# Patient Record
Sex: FEMALE | Race: BLACK OR AFRICAN AMERICAN | Employment: UNEMPLOYED | URBAN - METROPOLITAN AREA
[De-identification: names, ages, dates, MRNs, and addresses within clinical notes are randomized per-mention and may not be internally consistent; named-entity substitution may affect disease eponyms.]

---

## 2021-03-03 ENCOUNTER — HOSPITAL ENCOUNTER (OUTPATIENT)
Age: 26
Discharge: HOME | End: 2021-03-03
Payer: MEDICARE

## 2021-03-03 PROCEDURE — 86481 TB AG RESPONSE T-CELL SUSP: CPT

## 2021-03-06 LAB — T-SPOT TB TEST: NORMAL

## 2021-04-04 ENCOUNTER — APPOINTMENT (OUTPATIENT)
Dept: GENERAL RADIOLOGY | Age: 26
End: 2021-04-04
Payer: MEDICARE

## 2021-04-04 ENCOUNTER — HOSPITAL ENCOUNTER (EMERGENCY)
Age: 26
Discharge: HOME | End: 2021-04-04
Attending: EMERGENCY MEDICINE
Payer: MEDICARE

## 2021-04-04 VITALS
SYSTOLIC BLOOD PRESSURE: 126 MMHG | HEART RATE: 76 BPM | BODY MASS INDEX: 25.43 KG/M2 | HEIGHT: 67 IN | DIASTOLIC BLOOD PRESSURE: 73 MMHG | OXYGEN SATURATION: 100 % | RESPIRATION RATE: 16 BRPM | TEMPERATURE: 98.7 F | WEIGHT: 162 LBS

## 2021-04-04 DIAGNOSIS — M23.91 ACUTE INTERNAL DERANGEMENT OF RIGHT KNEE: Primary | ICD-10-CM

## 2021-04-04 PROCEDURE — 73562 X-RAY EXAM OF KNEE 3: CPT

## 2021-04-04 PROCEDURE — 99283 EMERGENCY DEPT VISIT LOW MDM: CPT

## 2021-04-04 RX ORDER — IBUPROFEN 800 MG/1
800 TABLET ORAL EVERY 8 HOURS PRN
Qty: 20 TABLET | Refills: 0 | Status: SHIPPED | OUTPATIENT
Start: 2021-04-04 | End: 2021-08-25

## 2021-04-04 RX ORDER — ACETAMINOPHEN AND CODEINE PHOSPHATE 300; 30 MG/1; MG/1
1 TABLET ORAL EVERY 6 HOURS PRN
Qty: 20 TABLET | Refills: 0 | Status: SHIPPED | OUTPATIENT
Start: 2021-04-04 | End: 2021-04-09

## 2021-04-04 ASSESSMENT — ENCOUNTER SYMPTOMS
EYE REDNESS: 0
ABDOMINAL PAIN: 0
CONSTIPATION: 0
FACIAL SWELLING: 0
DIARRHEA: 0
EYE DISCHARGE: 0
COUGH: 0
COLOR CHANGE: 0
VOMITING: 0
SHORTNESS OF BREATH: 0

## 2021-04-04 ASSESSMENT — PAIN DESCRIPTION - DESCRIPTORS: DESCRIPTORS: SHARP;CONSTANT

## 2021-04-04 NOTE — LETTER
[facility] ED  [address] [address]  Phone: [phone]             April 6, 2021    Patient: Jesica Bustillos   YOB: 1995   Date of Visit: 4/4/2021       To Whom It May Concern:    Jacob Adames was seen and treated in our emergency department on 4/4/2021. She may return to work on 4/8/21.       Sincerely,             Signature:__________________________________

## 2021-04-04 NOTE — ED PROVIDER NOTES
[address] [place] [address] ED  EMERGENCY DEPARTMENT ENCOUNTER      Pt Name: Vic Salvador  MRN: [id number]  [place] 1995  Date of evaluation: 4/4/2021  Provider: Karla Becker MD    CHIEF COMPLAINT       Chief Complaint   Patient presents with    Knee Injury     right knee, tripped going down steps this am         HISTORY OF PRESENT ILLNESS  (Location/Symptom, Timing/Onset, Context/Setting, Quality, Duration, Modifying Factors, Severity.)   Vic Salvador is a 31 y.o. female who presents to the emergency department for pain to the right knee. She was coming down steps and missed 1 and when she landed she twisted her right knee. She did not fall. Initially it was not swollen but it is now. 10 years ago she had ACL repair in [place]. She rated the pain as an 8 and its continuous and worse if she bends her knee. Nursing Notes were reviewed. ALLERGIES     Patient has no known allergies. CURRENT MEDICATIONS       Previous Medications    No medications on file       PAST MEDICAL HISTORY   History reviewed. No pertinent past medical history. SURGICAL HISTORY           Procedure Laterality Date    KNEE SURGERY           FAMILY HISTORY     History reviewed. No pertinent family history. No family status information on file. SOCIAL HISTORY      reports that she has never smoked. She has never used smokeless tobacco. She reports current alcohol use. She reports that she does not use drugs. REVIEW OF SYSTEMS    (2-9 systems for level 4, 10 or more for level 5)     Review of Systems   Constitutional: Negative for chills, fatigue and fever. HENT: Negative for congestion, ear discharge and facial swelling. Eyes: Negative for discharge and redness. Respiratory: Negative for cough and shortness of breath. Cardiovascular: Negative for chest pain. Gastrointestinal: Negative for abdominal pain, constipation, diarrhea and vomiting. Genitourinary: Negative for dysuria and hematuria. Musculoskeletal: Negative for arthralgias. Skin: Negative for color change and rash. Neurological: Negative for syncope, numbness and headaches. Hematological: Negative for adenopathy. Psychiatric/Behavioral: Negative for confusion. The patient is not nervous/anxious. Except as noted above the remainder of the review of systems was reviewed and negative. PHYSICAL EXAM    (up to 7 for level 4, 8 or more for level 5)     Vitals:    04/04/21 1240   BP: 126/73   Pulse: 76   Resp: 16   Temp: 98.7 °F (37.1 °C)   TempSrc: Oral   SpO2: 100%   Weight: 162 lb (73.5 kg)   Height: 5' 7\" (1.702 m)       Physical Exam  Vitals signs reviewed. Constitutional:       General: She is not in acute distress. Appearance: She is well-developed. She is not diaphoretic. HENT:      Head: Normocephalic and atraumatic. Eyes:      General: No scleral icterus. Right eye: No discharge. Left eye: No discharge. Neck:      Musculoskeletal: Neck supple. Cardiovascular:      Rate and Rhythm: Normal rate and regular rhythm. Pulmonary:      Effort: Pulmonary effort is normal. No respiratory distress. Breath sounds: Normal breath sounds. No stridor. No wheezing or rales. Abdominal:      General: There is no distension. Palpations: Abdomen is soft. Tenderness: There is no abdominal tenderness. Musculoskeletal:      Comments: Right knee is mildly swollen. There is no ballotable effusion. The knee joint is stable. No laxity on varus or valgus motion in the anterior and posterior drawer test are normal.  Ankle and hip nontender. Lymphadenopathy:      Cervical: No cervical adenopathy. Skin:     General: Skin is warm and dry. Findings: No erythema or rash. Neurological:      Mental Status: She is alert and oriented to person, place, and time.    Psychiatric:         Behavior: Behavior normal.             DIAGNOSTIC RESULTS     EKG: All EKG's are interpreted by the Emergency

## 2021-04-05 ENCOUNTER — TELEPHONE (OUTPATIENT)
Dept: ORTHOPEDIC SURGERY | Age: 26
End: 2021-04-05

## 2021-04-05 NOTE — TELEPHONE ENCOUNTER
----- Message from Patience Ramirez DO sent at 4/4/2021  5:49 PM EDT -----  Can see Ted Kuhn Tuesday or Wednesday  ----- Message -----  From: Gibran Sutherland MD  Sent: 4/4/2021   2:02 PM EDT  To: Patience Ramirez DO

## 2021-04-05 NOTE — TELEPHONE ENCOUNTER
Per Dr. Nadeem Chaves to have patient follow up with [facility]. Called and left message to get her in on Wednesday with [facility].

## 2021-04-07 DIAGNOSIS — M25.561 RIGHT KNEE PAIN, UNSPECIFIED CHRONICITY: Primary | ICD-10-CM

## 2021-04-08 ENCOUNTER — OFFICE VISIT (OUTPATIENT)
Dept: ORTHOPEDIC SURGERY | Age: 26
End: 2021-04-08
Payer: MEDICARE

## 2021-04-08 VITALS
SYSTOLIC BLOOD PRESSURE: 126 MMHG | DIASTOLIC BLOOD PRESSURE: 61 MMHG | WEIGHT: 162 LBS | HEIGHT: 67 IN | HEART RATE: 66 BPM | BODY MASS INDEX: 25.43 KG/M2

## 2021-04-08 DIAGNOSIS — S83.281A TEAR OF LATERAL MENISCUS OF RIGHT KNEE, CURRENT, UNSPECIFIED TEAR TYPE, INITIAL ENCOUNTER: Primary | ICD-10-CM

## 2021-04-08 PROCEDURE — 99203 OFFICE O/P NEW LOW 30 MIN: CPT | Performed by: ORTHOPAEDIC SURGERY

## 2021-04-08 PROCEDURE — G8419 CALC BMI OUT NRM PARAM NOF/U: HCPCS | Performed by: ORTHOPAEDIC SURGERY

## 2021-04-08 PROCEDURE — 1036F TOBACCO NON-USER: CPT | Performed by: ORTHOPAEDIC SURGERY

## 2021-04-08 PROCEDURE — G8427 DOCREV CUR MEDS BY ELIG CLIN: HCPCS | Performed by: ORTHOPAEDIC SURGERY

## 2021-04-08 ASSESSMENT — ENCOUNTER SYMPTOMS
CONSTIPATION: 0
DIARRHEA: 0
ABDOMINAL PAIN: 0
COLOR CHANGE: 0
VOMITING: 0
NAUSEA: 0
ABDOMINAL DISTENTION: 0
COUGH: 0
SHORTNESS OF BREATH: 0
CHEST TIGHTNESS: 0
APNEA: 0

## 2021-04-08 NOTE — PROGRESS NOTES
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 The pain has been present for 4 days. The patient recalls a specific injury where the knee twisted after she missed one step when she was walking down the stairs at her new apartment on 04/04/2021. The patient has tried tylenol #3, ibuprofen, rest and crutches with minimal improvement. The pain is now described as Achy and Sharp. There is pain on weight bearing. The knee has swelled. There is  painful popping and clicking. The knee has not caught or locked up. The knee has not given out. It is not stiff upon arising from sitting but it is painful when arising from sitting. It is painful to go up and down stairs and sit for a prolonged time. The patient has not had a cortisone injection. The patient has not tried a lubrication injection. The patient has not tried physical therapy. The patient has had surgery 10 years ago in [place] and she had her ACL reconstructed arthroscopically. Patient states that it is hard for her to work and she also mentioned that the knee is painful when shifting laterally. Patient was referred to us after she went to the ED at [facility] [address] on 04/04/2021. ROS:   Review of Systems   Constitutional: Positive for activity change. Negative for appetite change, fatigue and fever. Respiratory: Negative for apnea, cough, chest tightness and shortness of breath. Cardiovascular: Negative for chest pain, palpitations and leg swelling. Gastrointestinal: Negative for abdominal distention, abdominal pain, constipation, diarrhea, nausea and vomiting.    Genitourinary: Negative for difficulty urinating, dysuria and hematuria. Musculoskeletal: Positive for arthralgias. Negative for gait problem, joint swelling and myalgias. Skin: Negative for color change and rash. Neurological: Negative for dizziness, weakness, numbness and headaches. Psychiatric/Behavioral: Negative for sleep disturbance. Past Medical History:    No past medical history on file. Past Surgical History:    Past Surgical History:   Procedure Laterality Date    KNEE SURGERY       Current Medications:   Current Outpatient Medications   Medication Sig Dispense Refill    ibuprofen (ADVIL;MOTRIN) 800 MG tablet Take 1 tablet by mouth every 8 hours as needed for Pain 20 tablet 0     No current facility-administered medications for this visit. Allergies:    Patient has no known allergies.     Social History:   Social History     Socioeconomic History    Marital status: Single     Spouse name: None    Number of children: None    Years of education: None    Highest education level: None   Occupational History    None   Social Needs    Financial resource strain: None    Food insecurity     Worry: None     Inability: None    Transportation needs     Medical: None     Non-medical: None   Tobacco Use    Smoking status: Never Smoker    Smokeless tobacco: Never Used   Substance and Sexual Activity    Alcohol use: Yes     Comment: socially    Drug use: No    Sexual activity: None   Lifestyle    Physical activity     Days per week: None     Minutes per session: None    Stress: None   Relationships    Social connections     Talks on phone: None     Gets together: None     Attends Anabaptist service: None     Active member of club or organization: None     Attends meetings of clubs or organizations: None     Relationship status: None    Intimate partner violence     Fear of current or ex partner: None     Emotionally abused: None     Physically abused: None     Forced sexual activity: None   Other Topics Concern    None   Social History Narrative    None     Family History:  No family history on file. Vitals:   /61   Pulse 66   Ht 5' 7\" (1.702 m)   Wt 162 lb (73.5 kg)   LMP 03/21/2021   BMI 25.37 kg/m²  Body mass index is 25.37 kg/m². Physical Examination:     Orthopedics:    GENERAL: Alert and oriented X3 in no acute distress. SKIN: Intact without lesions or ulcerations. NEURO: Intact to sensory and motor testing. VASC: Capillary refill is less than 3 seconds. KNEE EXAM    LOCATION: Right Knee  GEN: Alert and oriented X 3, in no acute distress. GAIT: The patient's gait was observed while entering the exam room and was noted to be antalgic. The extremity is in anatomic alignment. SKIN: Intact without rashes, lesions, or ulcerations. No obvious deformity or swelling. NEURO: The patient responds to light touch throughout right LE. Patellar and Achilles reflexes are 2/4. VASC: The right LE is neurovascularly intact with 2/4 DP and 2/4 PT pulses. Brisk capillary refill. ROM: 0/116 degrees vs. 0/135 degrees contralaterally. There is moderate effusion. Lateral pain with forced extension. Popping noted with ROM testing. MUSC: decreased quad tone  LIGAMENT: Lachman's test is 1+ with Fair endpoint. Anterior drawer Negative. Posterior drawer Negative. There is 1+ varus instability at 0 degrees and 1+ varus instability at 30 degrees. There is No valgus instability at 0 degrees and 1+ valgus instability at 30 degrees. SPECIAL: Mary Jo test is positive with no clunks and no crepitation but there is pain with lateral popping. PALP: There is no joint line pain. Assessment:     1. Tear of lateral meniscus of right knee, current, unspecified tear type, initial encounter      Procedures:    Procedure: no  Radiology:   X-RAY Right Knee    One view patella reveals mild traumatic arthritic changes. Also 4/4/2021 ER NWB compared to 10/16/2016 x-rays.    Plan:   Treatment : I reviewed the X-ray with the patient and I informed her that the knee has no evident fractures that I can see on the radiographs. We discussed the etiologies and natural histories of acute tear of the lateral meniscus in the right knee and s/p right knee arthroscopy with ACL reconstruction. We discussed the various treatment alternatives including anti-inflammatory medications, physical therapy, injections, further imaging studies and as a last result surgery. During today's visit, I explained to the patient that I understand she injured her knee but I feel it would be best for us to have her attend physical therapy over the next few weeks so they can help her regain more ROM over the next few weeks because I do not feel she is doing any damage. I also explained to her that we will need to get her into PT because we need to get her back to work since she just started her new job. Altogether, I explained to her that I believe she may have an acute lateral meniscus tear because she had pain with Jean Pierre testing of the knee during my physical examination. I also told her that she is okay to weight bear on her knee as well. From there, I instructed the patient to take the ibuprofen every day so she can reduce her inflammation that she has in her knee today. In addition, I explained to her that we will order an MRI of the knee to check the soft tissue and cartilage for any tears and then based on what is going on in the knee, we will determine if surgical intervention is needed. The patient then stated that she understands the plan and at this time, the patient has opted for a physical therapy prescription and an order for an MRI of the right knee. A physical therapy prescription was given. Patient should return to the clinic after her MRI and she is to follow up with Jillian MCCORMICK to review the MRI of the right knee. The patient will call the office immediately with any problems.       No orders of the defined types were placed in this encounter. Orders Placed This Encounter   Procedures    MRI KNEE RIGHT WO CONTRAST     Standing Status:   Future     Standing Expiration Date:   4/8/2022     Order Specific Question:   Reason for exam:     Answer:   lateral meniscus tear    [facility]     Referral Priority:   Routine     Referral Type:   Eval and Treat     Referral Reason:   Specialty Services Required     Requested Specialty:   Physical Therapy     Number of Visits Requested:   1     Carlitos ROBBINS am scribing for and in the presence of Mliss January D. O.. 4/14/2021  3:32 PM     I spent 14 minutes of face to face time with this patient. Khushbu Mobley DO, have personally seen this patient and I have reviewed the CC, PMH, FHX and Social History as provided by other clinical staff. I reassessed the HPI and ROS as scribed by Zuleika Sin Scribjing in my presence and it is both accurate and complete. Thereafter, I personally performed the PE, reviewed the imaging and established the DX and POC. I agree with the documentation provided by the Medical Scribe. I have reviewed all documentation in its entirety prior to providing my signature indicating agreement. Any areas of disagreement are noted on the chart.     Electronically signed by Rony Mckenzie DO on 4/14/2021 at 3:33 PM        Electronically signed by Rony Mckenzie DO, on 4/14/2021 at 3:32 PM

## 2021-04-08 NOTE — LETTER
[address] [place] [address] and Sports Medicine  [address] [address]  Phone: [phone]  Fax: DO Markos        April 8, 2021     Patient: Bonnie Rodriguez   YOB: 1995   Date of Visit: 4/8/2021       To Whom It May Concern: It is my medical opinion that Opal Hidalgo remain off work until we get the results from her MRI of her right knee. If you have any questions or concerns, please don't hesitate to call.     Sincerely,        Shae Paige DO

## 2021-04-14 ENCOUNTER — HOSPITAL ENCOUNTER (OUTPATIENT)
Dept: PHYSICAL THERAPY | Facility: CLINIC | Age: 26
Setting detail: THERAPIES SERIES
Discharge: HOME | End: 2021-04-14
Payer: MEDICARE

## 2021-04-14 PROCEDURE — 97110 THERAPEUTIC EXERCISES: CPT

## 2021-04-14 PROCEDURE — 97161 PT EVAL LOW COMPLEX 20 MIN: CPT

## 2021-04-14 PROCEDURE — 97016 VASOPNEUMATIC DEVICE THERAPY: CPT

## 2021-04-14 NOTE — CONSULTS
[] [address] [place] [address]  Outpatient Rehabilitation &  Therapy  [address] [place] [address]  P: [phone]  F: [phone] [] [address]  P: [phone]  F: [phone] [x] [address]  [address] [address] [facility]   [address]   [place]: [phone]  F: [phone]         Physical Therapy Evaluation    Date:  2021  Patient: Annalisa Harrison   : 1995  MRN: [id number]  Physician: Dr. 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 The pain has been present for 4 days. The patient recalls a specific injury where the knee twisted after she missed one step when she was walking down the stairs at her new apartment on 2021. The patient has tried tylenol #3, ibuprofen, rest and crutches with minimal improvement. The pain is now described as Achy and Sharp. There is pain on weight bearing. The knee has swelled. There is  painful popping and clicking. The knee has not caught or locked up. The knee has not given out. It is not stiff upon arising from sitting but it is painful when arising from sitting. It is painful to go up and down stairs and sit for a prolonged time. The patient has not had a cortisone injection. The patient has not tried a lubrication injection. The patient has not tried physical therapy. The patient has had surgery 10 years ago in [place] and she had her ACL reconstructed arthroscopically. Patient states that it is hard for her to work and she also mentioned that the knee is painful when shifting laterally. Patient was referred to us after she went to the ED at [address] [place] [address],[address] on 2021. After it happened I went to work.  Was sitting for 2 hrs then on the floor for 3 hrs and by midnight couldn't walk anymore so got the knee checked out. It is getting better with regard to pain but the swelling is still there and still having some difficulty walking      PMHx: [] Unremarkable [] Diabetes [] HTN  [] Pacemaker   [] MI/Heart Problems [] Cancer [] Arthritis  [] Other:              [x] Refer to full medical chart  In EPIC     Tests: [] X-Ray: [x] MRI: Scheduled for 4/15/21 [] none:     Medications: [x] Refer to full medical record [] None [] Other:  Allergies:      [x] Refer to full medical record [] None [] Other:    Working:  [] Normal Duty  [] Light Duty  [x] Off D/T Condition  [] Retired    [] Not Employed    []  Disability  [] Other:           Return to work:unknown    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  Drawer  - []   Hip Flex WNL WNL   Post. Drawer   []   Ext WNL WNL 5- 5- Lachmans   []   ER WNL WNL 5 5 Valgus Stress  - []   IR     Varus Stress  - []   ABD WNL WNL 5 4+ Mary Jos  - []   Knee Flex 0-137 0-112   Apley compression  + []   Ext WNL WNL   Hip Scouring   []   Ankle DF stiff stiff   NORBERTs   []   PF WNL WNL   Piriformis   []   INV WNL WNL   Anibals   []   EVER WNL WNL   Michele     []   * pt reports that post ACL repair she was not able to get soft tissue approximation on the R knee with flexion    OBSERVATION No Deficit Deficit Not Tested Comments   Genu Valgus [] [x] [] B   Palpation [] [x] [] Mild joint line tenderness laterally   Sensation [] [] []    Edema [] [x] [] Knee jnt lineR36.3,L 33.5  Suprapatella R38.7,L 37.7   Patellar Mobility [x] [] []    Gait [] [x] [] Pt ambulates without device knee flexed throughout gait cycle          FUNCTIONAL TESTS PAIN NO PAIN COMMENTS   2 legged squat [x] [] Offloads L   1 legged squat [x] [] Valgus collapse       Functional Test: LEFI Score: 41% functionally impaired     Comments:  Assessment:Patient would benefit from skilled physical therapy services in order to: increase knee flexion ROM, increase functional strength and proprioception as well as decrease knee joint effusion to allow for normal gait  Problems:    [x] ? Pain:     [x] ? ROM:    [] ? Strength:    [x] ? Function:    [] ? Balance  [] Increased edema:  [] Postural Deviations  [x] Gait Deviations  [x]  LEFI score is 47/80  [] Other:      STG: (to be met in 10 treatments)  1. ? Pain:<3/10 R knee to facilitate normal gait  2. ? ROM: R Knee flexion 0-120 or better, R ankle DF 20 deg to facilitate normal gait  3. ? Function:Able to walk normally community distances without device  4. Independent with Home Exercise Programs    LTG: (to be met in 20 treatments)  1. No pain R knee to allow for normal daily function  2. Increase quad strength so that pt can demonstrate good eccentric quad control on 6inch step  3. LEFI 64/80 or better                 Patient goals: Reduce pain and walk normal    Rehab Potential:  [x] Good  [] Fair  [] Poor   Suggested Professional Referral:  [x] No  [] Yes:  Barriers to Goal Achievement[de-identified]  [x] No  [] Yes:  Domestic Concerns:  [x] No  [] Yes:    Pt. Education:  [x] Plans/Goals, Risks/Benefits discussed  [x] Home exercise program    Method of Education: [x] Verbal  [x] Demo  [x] Written- as per log  Comprehension of Education:  [x] Verbalizes understanding. [] Demonstrates understanding. [] Needs Review.   [] Demonstrates/verbalizes understanding of HEP/Ed previously given.    Treatment Plan:  [x] Therapeutic Exercise    [x] Therapeutic Activity  [x] Manual Therapy   [x] Alter G treadmill  [x] Phys perf test     [x] Vasocompression/Game Ready   [x] Neuromuscular Re-education [x] Instruction in HEP                               Frequency:  2x/week for 20 visits    Todays Treatment:  Modalities: Vasocompression 34 deg, medium  Pressure x 15 min  Precautions: standard  Exercises:  Exercise Reps/ Time Weight/ Level Issued for HEP  MOBO Y/N Comments   Prone         QS 10x10\"   x                       Supine         QS 10x10\"   x     SLR x10   x                       Gym         Bike warmup 5'   x     Step calf stretch 3x30\"   x     HS stretch 3x30\"   x                                                                                                                          Other:    Specific Instructions for next treatment: progress strengthening focused on knee and ankle ROM and LE strength to control genu valgus    Treatment Charges: Mins Units   [x] Evaluation       [x]  Low       []  Moderate       []  High 10 1   [] Phys perf test     [x]  Ther Exercise 25 2   []  Manual Therapy     []  Ther Activities     []  Aquatics     [x]  Vasocompression 15 1   []  NMR       TOTAL TREATMENT TIME: 50    Time in: 1109  Time Out:1405    Electronically signed by: Linn Self PT        Physician Signature:________________________________Date:__________________  By signing above or cosigning this note, I have reviewed this plan of care and certify a need for medically necessary rehabilitation services.      *PLEASE SIGN ABOVE AND FAX BACK ALL PAGES*

## 2021-04-15 ENCOUNTER — TELEPHONE (OUTPATIENT)
Dept: ORTHOPEDIC SURGERY | Age: 26
End: 2021-04-15

## 2021-04-15 ENCOUNTER — HOSPITAL ENCOUNTER (OUTPATIENT)
Dept: MRI IMAGING | Facility: CLINIC | Age: 26
Discharge: HOME | End: 2021-04-17
Payer: MEDICARE

## 2021-04-15 DIAGNOSIS — S83.281A TEAR OF LATERAL MENISCUS OF RIGHT KNEE, CURRENT, UNSPECIFIED TEAR TYPE, INITIAL ENCOUNTER: ICD-10-CM

## 2021-04-15 PROCEDURE — 73721 MRI JNT OF LWR EXTRE W/O DYE: CPT

## 2021-04-19 ENCOUNTER — OFFICE VISIT (OUTPATIENT)
Dept: ORTHOPEDIC SURGERY | Age: 26
End: 2021-04-19
Payer: MEDICARE

## 2021-04-19 ENCOUNTER — TELEPHONE (OUTPATIENT)
Dept: ORTHOPEDIC SURGERY | Age: 26
End: 2021-04-19

## 2021-04-19 VITALS
BODY MASS INDEX: 25.43 KG/M2 | WEIGHT: 162 LBS | HEIGHT: 67 IN | SYSTOLIC BLOOD PRESSURE: 117 MMHG | DIASTOLIC BLOOD PRESSURE: 78 MMHG | HEART RATE: 77 BPM

## 2021-04-19 DIAGNOSIS — Z98.890 HX OF ANTERIOR CRUCIATE LIGAMENT TEAR RECONSTRUCTION: ICD-10-CM

## 2021-04-19 DIAGNOSIS — M12.561 TRAUMATIC ARTHRITIS OF KNEE, RIGHT: ICD-10-CM

## 2021-04-19 DIAGNOSIS — S83.241D ACUTE MEDIAL MENISCUS TEAR OF RIGHT KNEE, SUBSEQUENT ENCOUNTER: Primary | ICD-10-CM

## 2021-04-19 PROCEDURE — G8427 DOCREV CUR MEDS BY ELIG CLIN: HCPCS | Performed by: ORTHOPAEDIC SURGERY

## 2021-04-19 PROCEDURE — 20610 DRAIN/INJ JOINT/BURSA W/O US: CPT | Performed by: ORTHOPAEDIC SURGERY

## 2021-04-19 PROCEDURE — G8419 CALC BMI OUT NRM PARAM NOF/U: HCPCS | Performed by: ORTHOPAEDIC SURGERY

## 2021-04-19 PROCEDURE — 99214 OFFICE O/P EST MOD 30 MIN: CPT | Performed by: ORTHOPAEDIC SURGERY

## 2021-04-19 PROCEDURE — 1036F TOBACCO NON-USER: CPT | Performed by: ORTHOPAEDIC SURGERY

## 2021-04-19 RX ORDER — LIDOCAINE HYDROCHLORIDE 10 MG/ML
8 INJECTION, SOLUTION INFILTRATION; PERINEURAL ONCE
Status: COMPLETED | OUTPATIENT
Start: 2021-04-19 | End: 2021-04-19

## 2021-04-19 RX ORDER — METHYLPREDNISOLONE ACETATE 40 MG/ML
40 INJECTION, SUSPENSION INTRA-ARTICULAR; INTRALESIONAL; INTRAMUSCULAR; SOFT TISSUE ONCE
Status: COMPLETED | OUTPATIENT
Start: 2021-04-19 | End: 2021-04-19

## 2021-04-19 RX ADMIN — METHYLPREDNISOLONE ACETATE 40 MG: 40 INJECTION, SUSPENSION INTRA-ARTICULAR; INTRALESIONAL; INTRAMUSCULAR; SOFT TISSUE at 12:25

## 2021-04-19 RX ADMIN — LIDOCAINE HYDROCHLORIDE 8 ML: 10 INJECTION, SOLUTION INFILTRATION; PERINEURAL at 12:23

## 2021-04-19 ASSESSMENT — ENCOUNTER SYMPTOMS
COUGH: 0
COLOR CHANGE: 0
CONSTIPATION: 0
APNEA: 0
NAUSEA: 0
ABDOMINAL DISTENTION: 0
SHORTNESS OF BREATH: 0
CHEST TIGHTNESS: 0
DIARRHEA: 0
ABDOMINAL PAIN: 0
VOMITING: 0

## 2021-04-19 NOTE — LETTER
[address] [place] [address] and Sports [address] [address]  Phone: [phone]  Fax: DO Markos        April 19, 2021     Patient: Jacob Sotomayor   YOB: 1995   Date of Visit: 4/19/2021       To Whom It May Concern: It is my medical opinion that Rhonda Alcaraz may return to work on 4/21/2021 with no restrictions. If you have any questions or concerns, please don't hesitate to call.     Sincerely,        Sydnie Barrett DO

## 2021-04-19 NOTE — PROGRESS NOTES
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 The pain has been present for 2 weeks. The patient recalls a specific injury where the knee was twisted on 04/04/2021 when she missed a step walking down her stairs at her new apartment. The patient has tried tylenol #3, ibuprofen, rest and crutches with no improvement. The pain is now described as Desiree Manisha and Sharp. There is pain on weight bearing. The knee has swelled. There is painful popping and clicking. The knee has not caught or locked up. The knee has not given out. It is not stiff upon arising from sitting. It is painful to go up and down stairs and sit for a prolonged time. The patient has not had a cortisone injection. The patient has not tried a lubrication injection. The patient has tried physical therapy and she states that therapy aggravate her knee. The patient has had surgery 10 years ago when she was in [place] and she states that she had an ACL reconstruction. Patient states that her knee pain is still the same and nothing has changed since her last office visit. She is here today to review her MRI. ROS:   Review of Systems   Constitutional: Positive for activity change. Negative for appetite change, fatigue and fever. Respiratory: Negative for apnea, cough, chest tightness and shortness of breath. Cardiovascular: Negative for chest pain, palpitations and leg swelling. Gastrointestinal: Negative for abdominal distention, abdominal pain, constipation, diarrhea, nausea and vomiting. Genitourinary: Negative for difficulty urinating, dysuria and hematuria. Musculoskeletal: Positive for arthralgias. Negative for gait problem, joint swelling and myalgias. Skin: Negative for color change and rash. Neurological: Negative for dizziness, weakness, numbness and headaches. Psychiatric/Behavioral: Negative for sleep disturbance. Past Medical History:    No past medical history on file. Past Surgical History:    Past Surgical History:   Procedure Laterality Date    KNEE SURGERY       Current Medications:   Current Outpatient Medications   Medication Sig Dispense Refill    ibuprofen (ADVIL;MOTRIN) 800 MG tablet Take 1 tablet by mouth every 8 hours as needed for Pain 20 tablet 0     No current facility-administered medications for this visit. Allergies:    Patient has no known allergies.     Social History:   Social History     Socioeconomic History    Marital status: Single     Spouse name: None    Number of children: None    Years of education: None    Highest education level: None   Occupational History    None   Social Needs    Financial resource strain: None    Food insecurity     Worry: None     Inability: None    Transportation needs     Medical: None     Non-medical: None   Tobacco Use    Smoking status: Never Smoker    Smokeless tobacco: Never Used   Substance and Sexual Activity    Alcohol use: Yes     Comment: socially    Drug use: No    Sexual activity: None   Lifestyle    Physical activity     Days per week: None     Minutes per session: None    Stress: None   Relationships    Social connections     Talks on phone: None     Gets together: None     Attends Religion service: None     Active member of club or organization: None     Attends meetings of clubs or organizations: None     Relationship status: None    Intimate partner violence     Fear of current or ex partner: None     Emotionally abused: None     Physically abused: None     Forced sexual activity: None   Other Topics Concern    None   Social History Narrative    None Family History:  No family history on file. Vitals:   /78   Pulse 77   Ht 5' 7\" (1.702 m)   Wt 162 lb (73.5 kg)   LMP 03/21/2021   BMI 25.37 kg/m²  Body mass index is 25.37 kg/m². Physical Examination:     Orthopedics:    GENERAL: Alert and oriented X3 in no acute distress. SKIN: Intact without lesions or ulcerations. NEURO: Intact to sensory and motor testing. VASC: Capillary refill is less than 3 seconds. KNEE EXAM    LOCATION: Right Knee  GEN: Alert and oriented X 3, in no acute distress. GAIT: The patient's gait was observed while entering the exam room and was noted to be antalgic. The extremity is in valgus alignment. SKIN: Intact without rashes, lesions, or ulcerations. No obvious deformity or swelling. NEURO: The patient responds to light touch throughout right LE. Patellar and Achilles reflexes are 2/4. VASC: The right LE is neurovascularly intact with 2/4 DP and 2/4 PT pulses. Brisk capillary refill. ROM: 5/114 degrees vs. 0/136 degrees contralaterally. There is mild effusion. Pain with forced extension. Valgus deformity is partially correctable. MUSC: decreased quad tone  LIGAMENT: Lachman's test is Negative with Good endpoint. Anterior drawer Negative. Posterior drawer Negative. There is 1+ varus instability at 0 degrees and 2+ varus instability at 30 degrees. There is No valgus instability at 0 degrees and No valgus instability at 30 degrees. SPECIAL: Mary Jo test is negative with no clunks, no crepitation, and no pain but there is popping. PALP: There is no joint line pain. Assessment:     1. Acute medial meniscus tear of right knee, subsequent encounter    2. Traumatic arthritis of knee, right    3.  Hx of anterior cruciate ligament tear reconstruction      Procedures:    Procedure: yes    Arthrocentesis    Location: Right Knee  Procedure: After consent was obtained, using sterile technique the Right knee was prepped and plain Lidocaine 1% was used as local anesthetic. The joint was entered and 17 cc's of yellow colored fluid was withdrawn. The procedure was well tolerated. The patient is asked to continue to rest the joint for a few more days before resuming regular activities. It may be more painful for the first 1-2 days. Watch for fever, or increased swelling or persistent pain in the joint. Call or return to clinic prn if such symptoms occur or there is failure to improve as anticipated. Regular Knee Injection    Location: Right Knee  Procedure: I discussed in detail the risks, benefits and complications of the corticosteroid injection which included but are not limited to: infection, skin reactions, hot swollen, and anaphylaxis with the patient. Sabrina Bronson verbalized understanding and they have agreed to have the corticosteroid injection into the right knee. The patient was placed in the Supine position on the exam table. The superior lateral portal was identified and marked with a ball point pen. The skin was prepped with betadine in a sterile fashion. Utilizing a clean technique with sterile gloves, a 5 cc solution containing 4 cc of 1.0% Lidocaine with 1 cc containing 40 mg of Depo-medrol  was injected. There was no resistance to the injection. The wound was cleansed and a band-aid was placed. the patient tolerated the procedure without difficulty. Adverse reactions to the injection were discussed with the patient including signs of infection (increasing pain, redness, swelling) and the patient was instructed to call immediately if experiencing any of these symptoms. Radiology:   Xr Knee Right (3 Views)    Result Date: 4/4/2021  1. Small to moderate joint effusion. Mild to moderate edema in Hoffa's fat. 2. Evidence of prior ACL reconstruction. 3. No acute fracture or dislocation. 4. Mild tricompartmental osteoarthrosis. Mri Knee Right Wo Contrast    Result Date: 4/15/2021  1. Oblique undersurface tear in the posterior horn medial meniscus.    2. Oblique superior articular surface tearing in the posterior horn lateral meniscus. 3. Degeneration in the anterior horn lateral meniscus. 4. ACL reconstruction graft which appears continuous/intact. 5. Mild-to-moderate tricompartmental osteophyte spurring. 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 6. Mild medial and patellofemoral chondromalacia. 7. Mild edema in the subcutaneous fat about the knee. 8. Small joint effusion. Plan:   Treatment : I reviewed the X-ray and MRI with the patient and I informed her that the knee has a tear of the medial meniscus with mild to moderate osteoarthritis. A copy of the MRI report was given to the patient for her records. We discussed the etiologies and natural histories of Tear of the medial meniscus in the right knee and s/p right knee arthroscopy with ACL reconstruction. We discussed the various treatment alternatives including anti-inflammatory medications, physical therapy, injections, further imaging studies and as a last result surgery. During today's visit, I explained to the patient that the arthritis that she has in her knee is moreover the problem than the meniscal tears. Since that is the case, I explained to the patient that we should try aspirating the fluid out of the knee and injecting it with cortisone to see if she will have good pain relief. Then I feel it would be best for her to attend physical therapy so they may help her regain her strength and ROM that she does not have within her knee. I then asked the patient if she is willing to try the conservative treatment and she stated that she is willing to try it. So at this time, the patient has opted for arthrocentesis, where we removed 17 cc's of yellow tinged synovial fluid from the right knee joint.  Patient also opted for a physical therapy prescription and a cortisone injection into the joint capsule of the Right knee to help reduce inflammation and pain. The injection site should never get red, hot, or swollen and if it does the patient will contact our office right away. The patient may experience a increase in soreness the first 24-48 hours due to a cortisone flair and can take anti-inflammatories for a short period of time to reduce that soreness. The patient should not submerge the injection site in water for a minimum of 24 hours to avoid infection. This means no lakes, pools, ponds, or hot tubs for 24 hours. If the patient is diabetic the injection may increase their blood sugar for up to one week. The patient can do this cortisone injection once every 3 months as needed. If the injections stop working and do not give the patient relief the patient should consider surgical interventions to produce long term relief. A physical therapy prescription was given. Lastly, I instructed the patient to take an NSAID a prescription dose to help decrease her inflammation over the next two weeks. An NSAID protocol was given. The patient was given the protocol and was instructed to take them over the counter at a prescription dose with food to help reduce inflammation and swelling in the joint. If the patient gets any kind of stomach pain, they were instructed to discontinue the medications immediately. If the patient notices that these medications are helping and would like to take them more chronically, the patient should follow up with their PCP to have their kidney function monitored to make sure they continue to take these medications safely. Patient also received a note stating that she may return back to work on 04/21/2021. Patient should return to the clinic in 6 weeks to follow up with Briana Betancur D.O. The patient will call the office immediately with any problems.       Orders Placed This Encounter   Medications    lidocaine 1 % injection 8 mL    methylPREDNISolone acetate (DEPO-MEDROL) injection 40 mg     Orders Placed This Encounter   Procedures    XR KNEE LEFT (1-2 VIEWS)     Standing AP/Tunnel     Standing Status:   Future     Number of Occurrences:   1     Standing Expiration Date:   4/19/2022     Order Specific Question:   Reason for exam:     Answer:   knee pain    XR KNEE RIGHT (1-2 VIEWS)     Standing AP/Tunnel     Standing Status:   Future     Number of Occurrences:   1     Standing Expiration Date:   4/19/2022     Order Specific Question:   Reason for exam:     Answer:   knee pain     I, Edward Day V, am scribing for and in the presence of German Shipley D.O. 4/25/2021  12:37 PM    I spent 30 minutes of face to face time with this patient. Rakan Jeter DO, have personally seen this patient and I have reviewed the CC, PMH, FHX and Social History as provided by other clinical staff. I reassessed the HPI and ROS as scribed by Zuleika Lea in my presence and it is both accurate and complete. Thereafter, I personally performed the PE, reviewed the imaging and established the DX and POC. I agree with the documentation provided by the Medical Scribe. I have reviewed all documentation in its entirety prior to providing my signature indicating agreement. Any areas of disagreement are noted on the chart.     Electronically signed by Ruy Mckeon DO on 4/25/2021 at 12:38 PM        Electronically signed by Ruy Mckeon DO, on 4/25/2021 at 12:37 PM

## 2021-04-19 NOTE — PATIENT INSTRUCTIONS
[address] [place] [address] and Sports Medicine    Dr. Lauren Moncada, DO & SHANTAL, PAEMANUEL, ATC    Over the counter anti-inflammatory protocol (NSAIDS)    You may take the following over the counter medication for only 10-14 days to  reduce inflammation. If you develop an upset stomach, diarrhea, heartburn/GERD symptoms   discontinue immediately. If you need the medication on a long-term basis >greater than 10-14 days. Please contact your family doctor/PCP to discuss your options as you   will require ongoing medical monitoring. Over the Counter/OTC             Ibuprofen/Advil/Motrin                                                                                                            200 mg tablets                  3-4 tables 3 times a day with food             OR            Naproxen Sodium, Aleve                    220 mg tablets          2 tablets 2 times a day with food           You May Add                           Tylenol extra strength, Acetaminophen           500 mg tablets               2 tablets every 8 hours    You may alternate with the NSAIDS for pain. NO more than 3000 mg of Tylenol/acetaminophen in 24 hours. Look at any OTC medications for added  Tylenol/acetaminophen--cold/sinus/flu medication. CORTISONE INJECTION CARE    The injection site should never get red, hot, or swollen and if it does the patient will contact our office right away. The patient may experience a increase in soreness the first 24-48 hours due to a cortisone flair and can take anti-inflammatories for a short period of time to reduce that soreness. The patient should not submerge the injection site in water for a minimum of 24 hours to avoid infection. This means no lakes, pools, ponds, or hot tubs for 24 hours. If the patient is diabetic the injection may increase their blood sugar for up to one week. The patient can do this cortisone injection once every 3 months as needed.

## 2021-04-21 ENCOUNTER — HOSPITAL ENCOUNTER (OUTPATIENT)
Dept: PHYSICAL THERAPY | Facility: CLINIC | Age: 26
Setting detail: THERAPIES SERIES
Discharge: HOME | End: 2021-04-21
Payer: MEDICARE

## 2021-04-21 PROCEDURE — 97110 THERAPEUTIC EXERCISES: CPT

## 2021-04-21 NOTE — FLOWSHEET NOTE
[x] [facility]  Outpatient Rehabilitation &  Therapy  [facility]   [place]: [phone]  F: [phone]      Physical Therapy Daily Treatment Note    Date:  2021  Patient Name:  Devendra Sterling    :  1995  MRN: [id number]  Physician: Dr. Eveline Pereyra: [employer]  Medical Diagnosis:   R lateral meniscus tear                         Rehab Codes: M25.561  Onset date:    21                            Next Dr's appt.:unknown  Visit# / total visits:      Cancels/No Shows:     Subjective:    Pain:  [] Yes  [] No Location: R knee  Pain Rating: (0-10 scale) 0/10  Pain altered Tx:  [] No  [] Yes  Action:  Comments: Pt with no pain this date reporting she saw her doctor Monday and he drained fluid from her knee and gave her a cortisone shot which seemed to help. Pt reports she is compliant with HEP. Objective:  Modalities:   Precautions:  Exercises:  Exercise:  R lateral meniscus tear   Reps/ Time Weight/ Level Comments         Bike 10'           *Step gastroc stretch  3x30\"     Hamstring step stretch 3x30\"           4-way hip  x15 [place]  [facility]   TKE 10x10\" Green     TGym squats/HR 2x10  L18          SLR x20  Medial knee pain    Quad sets 10x10\"                             Other:    Specific Instructions for next treatment: Update HEP    Treatment Charges: Mins Units   []  Modalities     [x]  Ther Exercise 30 2   []  Manual Therapy     []  Ther Activities     []  Aquatics     []  Vasocompression     []  Other     Total Treatment time 30 2       Assessment: [] Progressing toward goals. Completed all exercises ;osted above, pt notes sharp pain in medial knee with SLR, however, able to tolerate to complete exercise. Pt with good recall on all HEP exercises, will continue to progress strength and stability in RLE. [] No change.      [] Other:  [] Patient would continue to benefit from skilled physical therapy services in order to: decrease pain. STG/LTG    STG: (to be met in 10 treatments)  1. ? Pain:<3/10 R knee to facilitate normal gait  2. ? ROM: R Knee flexion 0-120 or better, R ankle DF 20 deg to facilitate normal gait  3. ? Function:Able to walk normally community distances without device  4. Independent with Home Exercise Programs     LTG: (to be met in 20 treatments)  1. No pain R knee to allow for normal daily function  2. Increase quad strength so that pt can demonstrate good eccentric quad control on 6inch step  3. LEFI 64/80 or better                 Patient goals: Reduce pain and walk normal     Rehab Potential:  [x]? Good  []? Fair  []? Poor    Suggested Professional Referral:  [x]? No  []? Yes:  Barriers to Goal Achievement[de-identified]  [x]? No  []? Yes:  Domestic Concerns:  [x]? No  []? Yes:     Pt. Education:  [x]? Plans/Goals, Risks/Benefits discussed  [x]? Home exercise program    Method of Education: [x]? Verbal  [x]? Demo  [x]? Written- as per log  Comprehension of Education:  [x]? Verbalizes understanding. []? Demonstrates understanding. []? Needs Review. []? Demonstrates/verbalizes understanding of HEP/Ed previously given. Plan: [] Continue current frequency toward long and short term goals.     [] Specific Instructions for subsequent treatments:       Time In: 11:20am            Time Out: 12:00pm    Electronically signed by:  Gregoria Dudley PTA

## 2021-04-23 ENCOUNTER — HOSPITAL ENCOUNTER (OUTPATIENT)
Dept: PHYSICAL THERAPY | Facility: CLINIC | Age: 26
Setting detail: THERAPIES SERIES
Discharge: HOME | End: 2021-04-23
Payer: MEDICARE

## 2021-04-23 NOTE — FLOWSHEET NOTE
[] [facility]) - [facility] &  Therapy  [address]    [phone]  F: [phone]   [] [address]  [facility]   [address]  P: [phone]  F: [phone]  [] [address] &  Therapy  [address]  P: [phone]  F: [phone] [] [address]  P: [phone]  F: [phone]  [x] [address]  [address] [address] [facility] [address]   [address]   Josemanuelcarmen Espinoza: [phone]  F: [phone]   [] [facility]  [address]  [place]manuel Barrientosles: [phone]   F: [phone]     Physical Therapy Cancel/No Show note    Date: 2021  Patient: Giovanna Avina  : 1995  MRN: [id number]    Cancels/No Shows to date:1    For today's appointment patient:    []  Cancelled    [] Rescheduled appointment    [x] No-show     Reason given by patient:    []  Patient ill    []  Conflicting appointment    [] No transportation      [] Conflict with work    [x] No reason given    [] Weather related    [] [id number]    [] Other:      Comments:        [] Next appointment was confirmed    Electronically signed by: Zoila Fair

## 2021-05-11 ENCOUNTER — HOSPITAL ENCOUNTER (EMERGENCY)
Age: 26
Discharge: LEFT BEFORE DISCHARGE | End: 2021-05-11
Payer: MEDICARE

## 2021-05-11 NOTE — ED NOTES
Per [facility] pt is refusing treatment, pt taken by [facility] to residential.       Yadira Wayne, KAREN  05/11/21 5123

## 2021-05-26 DIAGNOSIS — M25.512 LEFT SHOULDER PAIN, UNSPECIFIED CHRONICITY: Primary | ICD-10-CM

## 2021-06-03 ENCOUNTER — OFFICE VISIT (OUTPATIENT)
Dept: ORTHOPEDIC SURGERY | Age: 26
End: 2021-06-03
Payer: MEDICARE

## 2021-06-03 VITALS
WEIGHT: 156 LBS | HEART RATE: 67 BPM | BODY MASS INDEX: 24.48 KG/M2 | RESPIRATION RATE: 11 BRPM | SYSTOLIC BLOOD PRESSURE: 117 MMHG | DIASTOLIC BLOOD PRESSURE: 72 MMHG | HEIGHT: 67 IN

## 2021-06-03 DIAGNOSIS — S83.241D ACUTE MEDIAL MENISCUS TEAR OF RIGHT KNEE, SUBSEQUENT ENCOUNTER: Primary | ICD-10-CM

## 2021-06-03 DIAGNOSIS — M12.561 TRAUMATIC ARTHRITIS OF KNEE, RIGHT: ICD-10-CM

## 2021-06-03 DIAGNOSIS — Z98.890 HX OF ANTERIOR CRUCIATE LIGAMENT TEAR RECONSTRUCTION: ICD-10-CM

## 2021-06-03 PROCEDURE — G8428 CUR MEDS NOT DOCUMENT: HCPCS | Performed by: ORTHOPAEDIC SURGERY

## 2021-06-03 PROCEDURE — 1036F TOBACCO NON-USER: CPT | Performed by: ORTHOPAEDIC SURGERY

## 2021-06-03 PROCEDURE — G8420 CALC BMI NORM PARAMETERS: HCPCS | Performed by: ORTHOPAEDIC SURGERY

## 2021-06-03 PROCEDURE — 99213 OFFICE O/P EST LOW 20 MIN: CPT | Performed by: ORTHOPAEDIC SURGERY

## 2021-06-03 ASSESSMENT — ENCOUNTER SYMPTOMS
NAUSEA: 0
CONSTIPATION: 0
APNEA: 0
ABDOMINAL PAIN: 0
ABDOMINAL DISTENTION: 0
VOMITING: 0
CHEST TIGHTNESS: 0
COUGH: 0
COLOR CHANGE: 0
DIARRHEA: 0
SHORTNESS OF BREATH: 0

## 2021-06-03 NOTE — PROGRESS NOTES
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 The pain has been present for 8 weeks. The patient recalls a specific injury where the knee was twisted on 04/04/2021 when she missed a step walking down her stairs at her new apartment. The patient has tried tylenol #3, ibuprofen, rest and crutches with no improvement. The pain is now described as [place] and Sharp. There is pain on weight bearing. The knee has swelled. There is painful popping and clicking. The knee has not caught or locked up. The knee has not given out. It is not stiff upon arising from sitting. It is painful to go up and down stairs and sit for a prolonged time. The patient has had a cortisone injection on 4/19/2021 she got 100% relief for 1 week and lasting effects for about a month. The patient has not tried a lubrication injection. The patient has tried physical therapy and she states that therapy aggravate her knee. Since last visit she did not return to PT due to her work schedule but has been diligent with her home exercise program. She stats that she tried our NSAID protocol with no help. . The patient has had surgery 10 years ago when she was in [place] and she states that she had an ACL reconstruction. Patient states that her pain now is very positional and recurrent swelling. ROS:   Review of Systems   Constitutional: Positive for activity change. Negative for appetite change, fatigue and fever. Respiratory: Negative for apnea, cough, chest tightness and shortness of breath. Cardiovascular: Negative for chest pain, palpitations and leg swelling.    Gastrointestinal: Refill request  Patient of dr Libby Soria ed Connections:     Frequency of Communication with Friends and Family:     Frequency of Social Gatherings with Friends and Family:     Attends [employer] Services:     Active Member of Clubs or Organizations:     Attends Club or Organization Meetings:     Marital Status:    Intimate Partner Violence:     Fear of Current or Ex-Partner:     Emotionally Abused:     Physically Abused:     Sexually Abused:        Family History:  No family history on file. Vitals:   /72   Pulse 67   Resp 11   Ht 5' 7\" (1.702 m)   Wt 156 lb (70.8 kg)   BMI 24.43 kg/m²  Body mass index is 24.43 kg/m². Physical Examination:     Orthopedics:    GENERAL: Alert and oriented X3 in no acute distress. SKIN: Intact without lesions or ulcerations. NEURO: Intact to sensory and motor testing. VASC: Capillary refill is less than 3 seconds. KNEE EXAM    LOCATION: Right Knee  GEN: Alert and oriented X 3, in no acute distress. GAIT: The patient's gait was observed while entering the exam room and was noted to be antalgic. The extremity is in valgus alignment. SKIN: Intact without rashes, lesions, or ulcerations. No obvious deformity or swelling. NEURO: The patient responds to light touch throughout right LE. Patellar and Achilles reflexes are 2/4. VASC: The right LE is neurovascularly intact with 2/4 DP and 2/4 PT pulses. Brisk capillary refill. ROM: 0/114 degrees. There is mild effusion. MUSC: good quad tone  LIGAMENT: Lachman's test is Negative with Good endpoint. Anterior drawer Negative. Posterior drawer Negative. There is 1+ varus instability at 0 degrees and 2+ varus instability at 30 degrees. There is No valgus instability at 0 degrees and No valgus instability at 30 degrees. SPECIAL: Mary Jo test is negative with no clunks, no crepitation, and no pain. PALP: There is no joint line pain. Assessment:     1. Acute medial meniscus tear of right knee, subsequent encounter    2.  Traumatic arthritis of knee, right    3. Hx of anterior cruciate ligament tear reconstruction      Procedures:    Procedure: no  Radiology:   No results found. Plan:   Treatment : I reviewed the X-ray with the patient. We discussed the etiologies and natural histories of medial meniscus tear of right knee, history of right ACL reconstruction, post traumatic arthritis of right knee. We discussed the various treatment alternatives including anti-inflammatory medications, physical therapy, injections, further imaging studies and as a last result surgery. During today's visit, I did tell her that we can only really drain and inject her knee every 3 months. I think unless she would like to have another surgery that we try do maintain her strength and ROM to keep her pain at bay as much as possible. As for the recurrent swelling I do think that it could either be another tear in her knee or possible loose pieces, but without having mechanical symptoms and sharp stabbing pain, I do not think that a surgery right now is the best idea. I also did explain that due to the nature of her previous ACL reconstruction is that she does have arthritis in her knee at a very young age. We also discussed that her new employment, that we should hold off on a surgery until later this year if she wants to go that route. If we do end up doing a surgery that would be to fix any tears, take out any loose pieces, but that will not help with her arthritic pain that she is having. The patient has opted for continuing to monitor her symptoms over the coming weeks. She will plan a follow up with us in 6-8 weeks. If she starts having sharp stabbing pain with any mechanical symptoms she will return sooner. The plan is to delay a possible surgery, but knowing that we will proceed with one if the patient is willing. A physical therapy prescription was not given. Patient should return to the clinic in 6-8 weeks to follow up with Shabana Presume D.O. . The patient will call the office immediately with any problems. No orders of the defined types were placed in this encounter. No orders of the defined types were placed in this encounter. I, Milan Brady MS, AT, [facility], am scribing for and in the presence of Humphrey Bullard D.O.. 6/6/2021  7:49 PM    Electronically signed by Augustus Lauren DO, on 6/6/2021 at 7:49 PM       I, Humphrey Bullard DO, have personally seen this patient and I have reviewed the CC, PMH, FHX and Social History as provided by other clinical staff. I reassessed the HPI and ROS as scribed by Milan Brady MS AT [facility] in my presence and it is both accurate and complete. Thereafter, I personally performed the PE, reviewed the imaging and established the DX and POC. I agree with the documentation provided by the . I have reviewed all documentation in its entirety prior to providing my signature indicating agreement. Any areas of disagreement are noted on the chart.     Electronically signed by Augustus Lauren DO on 6/6/2021 at 7:50 PM

## 2021-08-05 ENCOUNTER — TELEPHONE (OUTPATIENT)
Dept: ORTHOPEDIC SURGERY | Age: 26
End: 2021-08-05

## 2021-08-25 ENCOUNTER — HOSPITAL ENCOUNTER (EMERGENCY)
Age: 26
Discharge: HOME | End: 2021-08-25
Attending: EMERGENCY MEDICINE
Payer: MEDICARE

## 2021-08-25 VITALS
HEIGHT: 67 IN | WEIGHT: 150 LBS | HEART RATE: 94 BPM | OXYGEN SATURATION: 98 % | RESPIRATION RATE: 14 BRPM | SYSTOLIC BLOOD PRESSURE: 129 MMHG | BODY MASS INDEX: 23.54 KG/M2 | TEMPERATURE: 98.6 F | DIASTOLIC BLOOD PRESSURE: 89 MMHG

## 2021-08-25 DIAGNOSIS — S61.411D LACERATION OF RIGHT HAND WITHOUT FOREIGN BODY, SUBSEQUENT ENCOUNTER: Primary | ICD-10-CM

## 2021-08-25 PROCEDURE — 6370000000 HC RX 637 (ALT 250 FOR IP): Performed by: EMERGENCY MEDICINE

## 2021-08-25 PROCEDURE — 99283 EMERGENCY DEPT VISIT LOW MDM: CPT

## 2021-08-25 RX ORDER — HYDROCODONE BITARTRATE AND ACETAMINOPHEN 5; 325 MG/1; MG/1
1 TABLET ORAL EVERY 6 HOURS PRN
Qty: 18 TABLET | Refills: 0 | Status: SHIPPED | OUTPATIENT
Start: 2021-08-25 | End: 2021-08-30

## 2021-08-25 RX ORDER — HYDROCODONE BITARTRATE AND ACETAMINOPHEN 5; 325 MG/1; MG/1
1 TABLET ORAL ONCE
Status: COMPLETED | OUTPATIENT
Start: 2021-08-25 | End: 2021-08-25

## 2021-08-25 RX ORDER — NAPROXEN 500 MG/1
500 TABLET ORAL 2 TIMES DAILY PRN
Qty: 14 TABLET | Refills: 0 | Status: ON HOLD | OUTPATIENT
Start: 2021-08-25 | End: 2022-06-23 | Stop reason: HOSPADM

## 2021-08-25 RX ADMIN — HYDROCODONE BITARTRATE AND ACETAMINOPHEN 1 TABLET: 5; 325 TABLET ORAL at 01:44

## 2021-08-25 ASSESSMENT — PAIN SCALES - GENERAL
PAINLEVEL_OUTOF10: 10
PAINLEVEL_OUTOF10: 10

## 2021-08-25 NOTE — ED PROVIDER NOTES
[facility]  Emergency Department Encounter     Pt Name: Annette Hung  MRN: [id number]  [place] 1995  Date of evaluation: 8/25/21  PCP:  Katya Sandra       Chief Complaint   Patient presents with    Wound Check       HISTORY OF PRESENT ILLNESS  (Location/Symptom, Timing/Onset, Context/Setting, Quality, Duration, Modifying Factors, Severity.)    Annette Hung is a 31 y.o. female who presents with laceration to right palm that happened a couple hours ago. She was seen at [facility] where she was given lidocaine and sutures were placed. She was discharged with wound dressing and information on the hand surgeon to follow-up as well as a prescription for Keflex. However the did not prescribe her anything for pain. She states that the numbing medicine is starting to wear off she is having discomfort. Has not taken anything at home prior to arrival.  No numbness or tingling but is having issues flexing her fourth and fifth digit. PAST MEDICAL / SURGICAL / SOCIAL / FAMILY HISTORY    has no past medical history on file. has a past surgical history that includes knee surgery.     Social History     Socioeconomic History    Marital status: Single     Spouse name: Not on file    Number of children: Not on file    Years of education: Not on file    Highest education level: Not on file   Occupational History    Not on file   Tobacco Use    Smoking status: Never Smoker    Smokeless tobacco: Never Used   Vaping Use    Vaping Use: Never used   Substance and Sexual Activity    Alcohol use: Yes     Comment: socially    Drug use: No    Sexual activity: Not on file   Other Topics Concern    Not on file   Social History Narrative    Not on file     Social Determinants of Health     Financial Resource Strain:     Difficulty of Paying Living Expenses:    Food Insecurity:     Worried About Running Out of Food in the Last Year:  Ran Out of Food in the Last Year:    Transportation Needs:     Lack of Transportation (Medical):  Lack of Transportation (Non-Medical):    Physical Activity:     Days of Exercise per Week:     Minutes of Exercise per Session:    Stress:     Feeling of Stress :    Social Connections:     Frequency of Communication with Friends and Family:     Frequency of Social Gatherings with Friends and Family:     Attends Denominational Services:     Active Member of Clubs or Organizations:     Attends Club or Organization Meetings:     Marital Status:    Intimate Partner Violence:     Fear of Current or Ex-Partner:     Emotionally Abused:     Physically Abused:     Sexually Abused:        History reviewed. No pertinent family history. Allergies:    Patient has no known allergies. Home Medications:  Prior to Admission medications    Medication Sig Start Date End Date Taking? Authorizing Provider   HYDROcodone-acetaminophen (NORCO) 5-325 MG per tablet Take 1 tablet by mouth every 6 hours as needed for Pain for up to 5 days. Intended supply: 3 days. Take lowest dose possible to manage pain 8/25/21 8/30/21 Yes Genny Albright DO   naproxen (NAPROSYN) 500 MG tablet Take 1 tablet by mouth 2 times daily as needed for Pain 8/25/21 9/1/21 Yes Genny Albright, DO       REVIEW OF SYSTEMS    (2-9 systems for level 4, 10 or more for level 5)    Review of Systems   Musculoskeletal: Positive for myalgias. Skin: Positive for wound. Neurological: Positive for weakness. Negative for numbness. Hematological: Does not bruise/bleed easily. PHYSICAL EXAM   (up to 7 for level 4, 8 or more for level 5)    VITALS:   Vitals:    08/25/21 0125   BP: 129/89   Pulse: 94   Resp: 14   Temp: 98.6 °F (37 °C)   TempSrc: Oral   SpO2: 98%   Weight: 150 lb (68 kg)   Height: 5' 7\" (1.702 m)       Physical Exam  Vitals and nursing note reviewed. Constitutional:       General: She is not in acute distress.      Appearance: She is well-developed. She is not diaphoretic. HENT:      Head: Normocephalic and atraumatic. Eyes:      Conjunctiva/sclera: Conjunctivae normal.   Cardiovascular:      Rate and Rhythm: Normal rate and regular rhythm. Pulses: Normal pulses. Pulmonary:      Effort: Pulmonary effort is normal. No respiratory distress. Musculoskeletal:         General: Tenderness present. Right hand: Tenderness present. No swelling, deformity or bony tenderness. Decreased range of motion. Decreased strength. Normal sensation. Normal capillary refill. Cervical back: Normal range of motion. Comments: Laceration to palmar aspect just distal to the fifth digit with 3 intact sutures. She does have decreased range of motion of the fourth and fifth digit with finger flexion. Cap refill less than 2 seconds. Radial pulses 2+. Distal sensation is soft touch intact. Skin:     General: Skin is warm and dry. Capillary Refill: Capillary refill takes less than 2 seconds. Neurological:      General: No focal deficit present. Mental Status: She is alert. Psychiatric:         Behavior: Behavior normal.         DIFFERENTIAL  DIAGNOSIS   PLAN (LABS / IMAGING / EKG):  Orders Placed This Encounter   Procedures    Wound care       MEDICATIONS ORDERED:  Orders Placed This Encounter   Medications    HYDROcodone-acetaminophen (NORCO) 5-325 MG per tablet 1 tablet    HYDROcodone-acetaminophen (NORCO) 5-325 MG per tablet     Sig: Take 1 tablet by mouth every 6 hours as needed for Pain for up to 5 days. Intended supply: 3 days. Take lowest dose possible to manage pain     Dispense:  18 tablet     Refill:  0    naproxen (NAPROSYN) 500 MG tablet     Sig: Take 1 tablet by mouth 2 times daily as needed for Pain     Dispense:  14 tablet     Refill:  0     DIAGNOSTIC RESULTS / EMERGENCYDEPARTMENT COURSE / MDM   LABS:  Labs Reviewed - No data to display    RADIOLOGY:  No results found.     EMERGENCY DEPARTMENT COURSE: DO  08/25/21 0148

## 2021-08-27 ENCOUNTER — TELEPHONE (OUTPATIENT)
Dept: ORTHOPEDIC SURGERY | Age: 26
End: 2021-08-27

## 2022-04-10 ENCOUNTER — APPOINTMENT (OUTPATIENT)
Dept: GENERAL RADIOLOGY | Age: 27
End: 2022-04-10
Payer: MEDICARE

## 2022-04-10 ENCOUNTER — HOSPITAL ENCOUNTER (EMERGENCY)
Age: 27
Discharge: HOME | End: 2022-04-10
Attending: EMERGENCY MEDICINE
Payer: MEDICARE

## 2022-04-10 VITALS
RESPIRATION RATE: 18 BRPM | TEMPERATURE: 98.4 F | DIASTOLIC BLOOD PRESSURE: 81 MMHG | OXYGEN SATURATION: 99 % | HEIGHT: 67 IN | BODY MASS INDEX: 27.47 KG/M2 | HEART RATE: 100 BPM | SYSTOLIC BLOOD PRESSURE: 127 MMHG | WEIGHT: 175 LBS

## 2022-04-10 DIAGNOSIS — S80.02XA CONTUSION OF LEFT KNEE, INITIAL ENCOUNTER: Primary | ICD-10-CM

## 2022-04-10 PROCEDURE — 6370000000 HC RX 637 (ALT 250 FOR IP): Performed by: PHYSICIAN ASSISTANT

## 2022-04-10 PROCEDURE — 73562 X-RAY EXAM OF KNEE 3: CPT

## 2022-04-10 PROCEDURE — 99285 EMERGENCY DEPT VISIT HI MDM: CPT

## 2022-04-10 RX ORDER — IBUPROFEN 600 MG/1
600 TABLET ORAL EVERY 6 HOURS PRN
Qty: 30 TABLET | Refills: 0 | Status: SHIPPED | OUTPATIENT
Start: 2022-04-10

## 2022-04-10 RX ORDER — IBUPROFEN 600 MG/1
600 TABLET ORAL ONCE
Status: COMPLETED | OUTPATIENT
Start: 2022-04-10 | End: 2022-04-10

## 2022-04-10 RX ORDER — ACETAMINOPHEN 325 MG/1
650 TABLET ORAL ONCE
Status: COMPLETED | OUTPATIENT
Start: 2022-04-10 | End: 2022-04-10

## 2022-04-10 RX ADMIN — ACETAMINOPHEN 650 MG: 325 TABLET ORAL at 19:57

## 2022-04-10 RX ADMIN — IBUPROFEN 600 MG: 600 TABLET ORAL at 19:57

## 2022-04-10 ASSESSMENT — PAIN SCALES - GENERAL
PAINLEVEL_OUTOF10: 10
PAINLEVEL_OUTOF10: 10
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
## 2022-04-10 NOTE — ED PROVIDER NOTES
eMERGENCY dEPARTMENT eNCOUnter   Independent Attestation     Pt Name: Eliane Campbell  MRN: [id number]  [place] 1995  Date of evaluation: 4/10/22     Eliane Campbell is a 31 y.o. female with CC: Knee Pain (L knee pain)      This visit was performed by both a physician and an APC. I performed all aspects of the MDM as documented. The care is provided during an unprecedented national emergency due to the novel coronavirus, COVID 19.     Ozzy Cuello DO  Attending Emergency Physician                    [address],   04/10/22 1940

## 2022-05-02 ENCOUNTER — HOSPITAL ENCOUNTER (OUTPATIENT)
Dept: MRI IMAGING | Facility: CLINIC | Age: 27
Discharge: HOME | End: 2022-05-04
Payer: MEDICARE

## 2022-05-02 DIAGNOSIS — M25.562 LEFT KNEE PAIN, UNSPECIFIED CHRONICITY: ICD-10-CM

## 2022-05-02 PROCEDURE — 73721 MRI JNT OF LWR EXTRE W/O DYE: CPT

## 2022-05-24 ENCOUNTER — HOSPITAL ENCOUNTER (OUTPATIENT)
Dept: PHYSICAL THERAPY | Facility: CLINIC | Age: 27
Setting detail: THERAPIES SERIES
Discharge: HOME | End: 2022-05-24
Payer: MEDICARE

## 2022-05-24 PROCEDURE — 97161 PT EVAL LOW COMPLEX 20 MIN: CPT

## 2022-05-24 PROCEDURE — 97110 THERAPEUTIC EXERCISES: CPT

## 2022-05-24 PROCEDURE — 97016 VASOPNEUMATIC DEVICE THERAPY: CPT

## 2022-05-24 NOTE — CONSULTS
[] [facility]) [facility] &  Therapy  [address]  [phone]  F: [phone] [] [address]  [facility]   [address]  P: [phone]  F: [phone] [] [address] &  Therapy  [address]  P: [phone]  F: [phone] [] [address]  P: [phone]  F: [phone] [x] [address]  [facility]   [address]   [place]: [phone]  F: [phone]      Physical Therapy Lower Extremity Evaluation    Date:  2022  Patient: Virgie Serna     : 1995  MRN: [id number]  Physician: Dr. Susy Zendejas: [facility] (30 vs)   Medical Diagnosis: Complete tear of ACL of L knee    Rehab Codes: V36.619I, M62.81, R26.89, R60.0, M79.605, M25.662  Onset date: 4/10/22      Next Dr's appt. : 22    Subjective:   CC/HPI: Patient is a 32 y/o female presenting with L knee pain since 4/10/22. Patient reports that she was running and lost her balance, twister her knee when she took a step on the uneven surface. Went to emergency room the next day as she was having difficulty walking. Saw Dr. Almita Walker on 22 and he aspirated her joint at that time. She reports that the swelling returned the next day. Had MRI showing (+) ACL tear. Plans to undergo surgery on 22, plans to use a patellar tendon. Was fitting for a new brace at Dr. Jossie Velez office. Moved back from [place] on 3/18/22. Currently wearing a neoprene brace on the L knee. Has hx of R knee ACL reconstruction in . Did not undergo rehabilitation at that time.         PMHx: [] Unremarkable [] Diabetes [] HTN  [] Pacemaker   [] MI/Heart Problems [] Cancer [] Arthritis [] Other:              [x] Refer to full medical chart  In EPIC     Past Surgical History         Laterality Date Comments   KNEE SURGERY [TWP656] Right  2011  ACL reconstruction   Hand surgery [HCL174] Right 08/09/2021 RLF,RRF Nerve repair Dr. Sabrina Amos                Comorbidities:   [] Obesity [] Dialysis  [] N/A   [] Asthma/COPD [] Dementia [] Other:   [] Stroke [] Sleep apnea [] Other:   [] Vascular disease [] Rheumatic disease [] Other:     Tests:   [] X-Ray:   [x] MRI: 4/18/22 L knee   Impression   1. Complete ACL tear with kissing bone contusion pattern and large joint   effusion.  Probable nondisplaced microtrabecular fracturing in the regions of   the kissing bone contusions. 2. Complex multidirectional tearing throughout the substance of the lateral   meniscus. 3. Grade 2 MCL injury.  Grade 1 LCL injury. 4. Mild edema in Hoffa's fat. 5. Moderate edema in the subcutaneous fat about the knee. 6. Moderate patellar tendinosis.  Mild distal quadriceps tendinosis.    7. 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         [] Other:    Medications: [x] Refer to full medical record [] None [] Other:  Allergies:      [x] Refer to full medical record  [] None [] Other:    Function:  Hand Dominance  [x] Right  [] Left  Marital Status  Patient lives with  Lives Alone   Home type  Equipment Duplex Apartment   Stairs from outside Full flight   Stairs inside 0   Employement Unemployed    Job status --   Work Activities/duties  --   Recreational Activities Running, basketball, gym workouts        ADL/IADL [x] Previously independent with all [x] Currently independent with all Who currently assists the patient with task    [] Previously independent with all except: [] Currently independent with all except:    Bathing  [] Assist [] Assist    Dress/grooming [] Assist [] Assist    Transfer/mobility [] Assist [] Assist    Feeding [] Assist [] Assist    Toileting [] Assist [] Assist    Driving [] Assist [] Assist    Housekeeping [] Assist [] Assist Grocery shop/meal prep [] Assist [] Assist        Gait Prior level of function Current level of function    [x] Independent  [] Assist [x] Independent  [] Assist   Device: [x] Independent [x] Independent    [] Straight Cane [] Quad cane [] Straight Cane [] Quad cane    [] Standard walker [] Rolling walker   [] 4 wheeled walker [] Standard walker [] Rolling walker   [] 4 wheeled walker    [] Wheelchair [] Wheelchair       Pain present? Yes    Location L knee    Pain Rating currently 8/10   Pain at worse 9/10   Pain at best 8/10   Description of pain Unstable of her knee, generalized weakness, constant achiness   Altered Sensation Intact   What makes it worse Putting on her shoes, getting out of bed, stairs, walking   What makes it better Ibuprofen   Symptom progression Unchanged since onset   Sleep Intermittent waking secondary to L knee pain            Objective:    ROM  ° A/P STRENGTH TESTS (+/-) Left Right Not Tested    Left Right Left Right Ant. Drawer   [x]   Hip Flex [facility] WFL 5 5 Post. Drawer   [x]   Ext WFL Dec by 25% 4- 4- Lachmans + - []   ER     Valgus Stress   [x]   IR     Varus Stress   [x]   ABD   4- 4- Mary Jos   [x]   ADD     Apleys Comp.    [x]   Knee Flex 115  130 4 5 Apleys Dist.   [x]   Ext 3 6 4 5 Hip Scouring   [x]   Ankle DF 0 10 5 5 NORBERTs   [x]   PF   5 5 Piriformis   [x]   INV     Anibals   [x]   EVER     Talor Tilt   [x]        Pat-Fem Grind   [x]   No quad lag noted bilaterally     OBSERVATION No Deficit Deficit Not Tested Comments   Posture       Forward Head [] [x] []    Rounded Shoulders [] [x] []    Genu Valgus [] [x] [] Bilaterally, noted in standing and gait    Genu Varus [x] [] []    Genu Recurvatum [] [x] [] Bilaterally R>L   Pronation [] [x] [] Bilaterally    Supination [x] [] []    Leg Length Discrp [] [x] []    Slumped Sitting [] [x] []    Palpation [] [x] [] Mild tenderness to palpation of the L knee, lateral and joint line   Sensation [x] [] []    Edema [] [x] [] L (cm)  Suprapatellar: 42.5  Midpatellar: 40  Infrapatellar: 37  R (cm)  Suprapatellar: 44  Midpatellar: 40.5  Infrapatellar: 37.5   Neurological [x] [] []    Patellar Mobility [] [x] [] Hypermobility of the L patella   Patellar Orientation [] [x] [] Patella napoleon bilaterally    Gait [] [x] [] Analysis: Early heel off on LLE, decreased L stance time  No AD, no brace          Flexibility Normal Left tight Right tight Comments   Hip flexor [] [] [x]    quad [] [] []    HS [] [x] [x] Mild bilaterally    piriformis [x] [] []    ITB [x] [] []    gastroc [] [x] [] See DF ROM   Soleus  [] [x] [] See DF ROM    [] [] []     [] [] []        FUNCTION Normal Difficult Unable   Sitting [x] [] []   Standing [x] [] []   Ambulation [] [x] []   Groom/Dress [x] [] []   Lift/Carry [] [x] []   Stairs [] [x] []   Bending [] [x] []   Squat [] [x] []   Kneel [] [x] []     BALANCE/PROPRIOCEPTION              [] Not tested   Single leg stance       R                     L                                PAIN   Eyes open                     10     Sec.       5        Sec                  . [x]    Eyes closed                          Sec. Sec                  . []        FUNCTIONAL TESTS PAIN NO PAIN COMMENTS   Step Test 4 [] []    6 [] []    8 [] []    Squat [x] [] Weight shifting to the RLE      Functional Test: Lower Extremity Functional Scale (LEFS)  Score: 44/80 = 45% functionally impaired     Comments:    Assessment:  32 y/o female presents with L knee pain and edema since 4/10/22 after a twist while running. Patient with (+) MRI findings and plans to undergo a ACL reconstruction by Dr. Tran Kaur on 6/23/22. Patient here for pre-habilitation. Patient would benefit from skilled physical therapy services in order to: improve L knee ROM, improve RLE strength, and decrease edema and pain to improve post-surgical outcomes       Problems:    [x] ? Pain: 8/10 L knee pain   [x] ? ROM: decreased L knee ROM compared to R   [x] ? Strength: decreased bilateral hip strength, mild quad weakness- no quad lag noted   [x] ? Function: 45% impairment on Lower Extremity Functional Scale (LEFS), decreased LLE SLS stability   [] Other: Only STG written this date due to decreased time available prior to surgery  Goals  MET NOT MET ON-  GOING  Details   Date Addressed:        STG: To be met in 4 treatments           1. ? Pain: Decrease L knee pain levels to 4/10 with ADLs []  []  []      2. ? ROM: Increase L knee AROM limitations throughout to equal bilat to reduce difficulty with ADLs []  []  []      3. ? Strength: Increase MMT to 5/5 throughout to ease functional limitations and mobility  []  []  []     4. SLS for at least 10 seconds on LLE []  []  []      5. Independent with Home Exercise Programs []  []  []                           Patient goals: decrease pain and edema, return to running       Rehab Potential:  [x] Good  [] Fair  [] Poor   Suggested Professional Referral:  [x] No  [] Yes:  Barriers to Goal Achievement:  [x] No  [] Yes:  Domestic Concerns:  [x] No  [] Yes:    Pt. Education:  [x] Plans/Goals, Risks/Benefits discussed  [x] Home exercise program    Method of Education: [x] Verbal  [x] Demo  [x] Written    Access Code: [email or web address]  URL: [email or web address]  Date: 05/24/2022  Prepared by: Jagdeep Calderon    Exercises  Long Sitting Quad Set - 1 x daily - 7 x weekly - 3 sets - 10 reps - 10 (sec) hold  Small Range Straight Leg Raise - 1 x daily - 7 x weekly - 3 sets - 10 reps  Sidelying Hip Abduction - 1 x daily - 7 x weekly - 3 sets - 10 reps  Supine Heel Slide - 1 x daily - 7 x weekly - 3 sets - 10 reps  Standing Gastroc Stretch on Step - 1 x daily - 7 x weekly - 3 sets - 30 hold  Hip Flexor Stretch with Chair - 1 x daily - 7 x weekly - 3 sets - 30 hold      Comprehension of Education:  [x] Verbalizes understanding. [x] Demonstrates understanding. [x] Needs Review.   [] Demonstrates/verbalizes understanding of HEP/Ed previously given. Treatment Plan:  [x] Therapeutic Exercise   39552  [] Iontophoresis: 4 mg/mL Dexamethasone Sodium Phosphate  mAmin  02997   [] Therapeutic Activity  40029 [x] Vasopneumatic cold with compression  16005    [x] Gait Training   38765 [] Ultrasound   17755   [x] Neuromuscular Re-education  04811 [] Electrical Stimulation Unattended  69089   [x] Manual Therapy  83460 [] Electrical Stimulation Attended  33159   [x] Instruction in HEP  [] Lumbar/Cervical Traction  24709   [] Aquatic Therapy   55153 [] Cold/hotpack    [] Massage   90700      [] Dry Needling, 1 or 2 muscles  36041   [] Biofeedback, first 15 minutes   48836  [] Biofeedback, additional 15 minutes   72683 [] Dry Needling, 3 or more muscles  89926     []  Medication allergies reviewed for use of    Dexamethasone Sodium Phosphate 4mg/ml     with iontophoresis treatments. Pt is not allergic.     Frequency:  1-2 x/week for 6-8 visits      Todays Treatment:  Modalities:   Precautions: ACL pre-hab   Exercises:  Exercise    L Knee Reps/ Time Weight/ Level Comments         Bike/NuStep             Calf Stretch  x30\"     Hamstring Stretch       Hip flexor stretch at step  x30\"     Quad Set       SLR  x10   LLE only    Heel Slides  x10  A    Clamshells       Bridges       Sidelying hip abduction  x5           4-way hip Tband       Tband TKE       Total Gym Squats                 Other: vasocompression mod pressure x15 min, supine LLE elevated on bolster, pillow case under compression sleeve       Specific Instructions for next treatment: bilateral LE strength, L knee ROM       Evaluation Complexity:  History (Personal factors, comorbidities) [] 0 [x] 1-2 [] 3+   Exam (limitations, restrictions) [] 1-2 [] 3 [x] 4+   Clinical presentation (progression) [x] Stable [] Evolving  [] Unstable   Decision Making [x] Low [] Moderate [] High    [x] Low Complexity [] Moderate Complexity [] High Complexity       Treatment Charges: Mins Units   [x] Evaluation       [x]  Low       []  Moderate       []  High 31 1   []  Modalities     [x]  Ther Exercise 10 1   []  Manual Therapy     []  Ther Activities     []  Aquatics     [x]  Vasocompression 15 1   []  Other       TOTAL TREATMENT TIME: 56 min     Time in:5:02p   Time Out:5:58p    Electronically signed by: Moi Gil PT        Physician Signature:________________________________Date:__________________  By signing above or cosigning this note, I have reviewed this plan of care and certify a need for medically necessary rehabilitation services.      *PLEASE SIGN ABOVE AND FAX BACK ALL PAGES*

## 2022-05-26 ENCOUNTER — HOSPITAL ENCOUNTER (OUTPATIENT)
Dept: PHYSICAL THERAPY | Facility: CLINIC | Age: 27
Setting detail: THERAPIES SERIES
Discharge: HOME | End: 2022-05-26
Payer: MEDICARE

## 2022-05-26 PROCEDURE — 97110 THERAPEUTIC EXERCISES: CPT

## 2022-05-26 PROCEDURE — 97016 VASOPNEUMATIC DEVICE THERAPY: CPT

## 2022-05-26 NOTE — FLOWSHEET NOTE
[x] THE [facility] &  Therapy  [facility]   [place]: [phone]  F: [phone]      Physical Therapy Daily Treatment Note    Date:  2022  Patient Name:  Virgie Serna    :  1995  MRN: [id number]  Physician: Dr. Darius Concepcionport: [employer] (30 vs)   Medical Diagnosis: Complete tear of ACL of L knee             Rehab Codes: P94.244F, M62.81, R26.89, R60.0, M79.605, M25.662  Onset date: 4/10/22                                                               Next 's appt. : 22    Visit# / total visits: 2/4     Cancels/No Shows:     Subjective:    Pain:  [x] Yes  [] No Location: LLE  Pain Rating: (0-10 scale) 7/10  Pain altered Tx:  [x] No  [] Yes  Action:  Comments: Patient arrived noting increased pain this date, notes just woke up and is always worse after waking up. Objective:  Modalities:   Precautions: ACL pre-hab   Exercises:  Exercise     L Knee Reps/ Time Weight/ Level Comments             Bike 10'                 Calf Stretch   3x30\"       Hamstring Stretch   3x30\"       Hip flexor stretch at step   x30\"             Quad Set   10x10\"       SLR   2x10   LLE only    Heel Slides   x10  A     Clamshells   2x10  Green     Bridges   2x10       Sidelying hip abduction   2x10                 4-way hip Tband   x15  Green     Tband TKE   10x10\"  Blue     Total Gym Squat/HR   2x10  L20     Balance board  5'  L2                 Other: vasocompression mod pressure x15 min, supine LLE elevated on bolster, pillow case under compression sleeve      Specific Instructions for next treatment: bilateral LE strength, L knee ROM     Treatment Charges: Mins Units   []  Modalities     [x]  Ther Exercise 40 3   []  Manual Therapy     []  Ther Activities     []  Aquatics     [x]  Vasocompression 15 1   []  Other     Total Treatment time 55 4       Assessment: [x] Progressing toward goals. Progressed strength program this date. Patient notes most discomfort with extension based exercises and stretches mostly with HS stretch and TKE. No instability noted with current program. Increased fatigue with mat exercises. Issued handouts and resistive bands for HEP this date. [] No change. [] Other:  [x] Patient would continue to benefit from skilled physical therapy services in order to: improve L knee ROM, improve RLE strength, and decrease edema and pain to improve post-surgical outcomes. Only STG written this date due to decreased time available prior to surgery  Goals  MET NOT MET ON-  GOING  Details   Date Addressed:            STG: To be met in 4 treatments  ?          1. ? Pain: Decrease L knee pain levels to 4/10 with ADLs []? ?  []??  []??      2. ? ROM: Increase L knee AROM limitations throughout to equal bilat to reduce difficulty with ADLs []? ?  []??  []??      3. ? Strength: Increase MMT to 5/5 throughout to ease functional limitations and mobility  []? ?  []??  []??      4. SLS for at least 10 seconds on LLE []??  []??  []??       5. Independent with Home Exercise Programs []? ?  []??  []??                                Patient goals: decrease pain and edema, return to running     Pt. Education:  [x] Yes  [] No  [x] Reviewed Prior HEP/Ed  Method of Education: [x] Verbal  [] Demo  [x] Written:  Access Code: [email or web address]  URL: [email or web address]  Date: 05/26/2022  Prepared by: Ge Jay    Exercises  Standing Hip Abduction with Anchored Resistance - 1 x daily - 7 x weekly - 3 sets - 10 reps  Standing Hip Extension with Anchored Resistance - 1 x daily - 7 x weekly - 3 sets - 10 reps  Standing Hip Adduction with Anchored Resistance - 1 x daily - 7 x weekly - 3 sets - 10 reps  Standing Repeated Hip Flexion with Resistance - 1 x daily - 7 x weekly - 3 sets - 10 reps  Standing Terminal Knee Extension with Resistance - 1 x daily - 7 x weekly - 3 sets - 10 reps - 30 (sec) hold  Sidelying Hip Abduction - 1 x daily - 7 x weekly - 3 sets - 10 reps  Clamshell - 1 x daily - 7 x weekly - 3 sets - 10 reps  Small Range Straight Leg Raise - 1 x daily - 7 x weekly - 3 sets - 10 reps    Comprehension of Education:  [x] Verbalizes understanding. [] Demonstrates understanding. [] Needs review. [x] Demonstrates/verbalizes HEP/Ed previously given. Plan: [x] Continue current frequency toward long and short term goals. [x] Specific Instructions for subsequent treatments: progress strength program and knee ROM.       Time In: 1300               Time Out: 1405     Electronically signed by:  Caitlyn Soliman PTA

## 2022-05-31 ENCOUNTER — HOSPITAL ENCOUNTER (OUTPATIENT)
Dept: PHYSICAL THERAPY | Facility: CLINIC | Age: 27
Setting detail: THERAPIES SERIES
Discharge: HOME | End: 2022-05-31
Payer: MEDICARE

## 2022-05-31 PROCEDURE — 97016 VASOPNEUMATIC DEVICE THERAPY: CPT

## 2022-05-31 PROCEDURE — 97110 THERAPEUTIC EXERCISES: CPT

## 2022-05-31 NOTE — FLOWSHEET NOTE
[x] THE [facility] &  Therapy  [facility]   [place]: [phone]  F: [phone]      Physical Therapy Daily Treatment Note    Date:  2022  Patient Name:  Dasha Arizmendi      :  1995  MRN: [id number]  Physician: Dr. Letty Miranda: [employer] ( vs)   Medical Diagnosis: Complete tear of ACL of L knee             Rehab Codes: W01.439B, M62.81, R26.89, R60.0, M79.605, M25.662  Onset date: 4/10/22                                                               Next 's appt. : 22  Visit# / total visits: 3/4     Cancels/No Shows: 0    Subjective:    Pain:  [x] Yes  [] No Location: LLE  Pain Rating: (0-10 scale) not rated/10  Pain altered Tx:  [x] No  [] Yes  Action:  Comments: Patient arrives with mild pain complaints. She reports that she is supposed to get her new fitted brace this Wednesday.      Objective:  Modalities:   Precautions: ACL pre-hab   Exercises:  Exercise     L Knee Reps/ Time Weight/ Level Comments             Bike 7'                Calf Stretch   3x30\"       Hamstring Stretch   3x30\"       Hip flexor stretch at step   x30\"             Quad Set   10x10\"       SLR   2x10   LLE only    Heel Slides   x10  A     Clamshells   2x10  Green     Bridges   2x10       Sidelying hip abduction   2x10                 4-way hip Tband   x15  Green     Tband TKE   15x10\"  Blue     Total Gym Squat/HR   2x10 ea  L20     Balance board  5'  L2                 Other: vasocompression mod pressure x15 min, supine LLE elevated on bolster, pillow case under compression sleeve      Specific Instructions for next treatment: bilateral LE strength, L knee ROM       Treatment Charges: Mins Units   []  Modalities     [x]  Ther Exercise 40 3   []  Manual Therapy     []  Ther Activities     []  Aquatics     [x]  Vasocompression 15 1   []  Other     Total Treatment time 55 4       Assessment: [x] Progressing toward goals. Unable to tolerate a full 10 minute bike warm up this date secondary to pain. No cueing required for stretches provided on HEP. Patient denies pain with exercises this date. L knee ROM measures -4 to 128 degrees AAROM, significantly improved from initial visit. She is agreeable to decrease frequency to 1x/week until her surgery on 6/23/22. Mild swelling still noted to the L knee, encouraged to continue to ice and elevate as able throughout her day. [] No change. [] Other:  [x] Patient would continue to benefit from skilled physical therapy services in order to: improve L knee ROM, improve RLE strength, and decrease edema and pain to improve post-surgical outcomes. Only STG written this date due to decreased time available prior to surgery  Goals  MET NOT MET ON-  GOING  Details   Date Addressed:            STG: To be met in 4 treatments  ?          1. ? Pain: Decrease L knee pain levels to 4/10 with ADLs []? ?  []??  []??      2. ? ROM: Increase L knee AROM limitations throughout to equal bilat to reduce difficulty with ADLs []? ?  []??  []??      3. ? Strength: Increase MMT to 5/5 throughout to ease functional limitations and mobility  []? ?  []??  []??      4. SLS for at least 10 seconds on LLE []??  []??  []??       5. Independent with Home Exercise Programs []? ?  []??  []??                                Patient goals: decrease pain and edema, return to running     Pt. Education:  [x] Yes  [] No  [x] Reviewed Prior HEP/Ed  Method of Education: [x] Verbal  [] Demo  [x] Written:  Access Code: [email or web address]  URL: [email or web address]  Date: 05/26/2022  Prepared by: Dariana Garcia    Exercises  Standing Hip Abduction with Anchored Resistance - 1 x daily - 7 x weekly - 3 sets - 10 reps  Standing Hip Extension with Anchored Resistance - 1 x daily - 7 x weekly - 3 sets - 10 reps  Standing Hip Adduction with Anchored Resistance - 1 x daily - 7 x weekly - 3 sets - 10 reps  Standing Repeated Hip Flexion with Resistance - 1 x daily - 7 x weekly - 3 sets - 10 reps  Standing Terminal Knee Extension with Resistance - 1 x daily - 7 x weekly - 3 sets - 10 reps - 30 (sec) hold  Sidelying Hip Abduction - 1 x daily - 7 x weekly - 3 sets - 10 reps  Clamshell - 1 x daily - 7 x weekly - 3 sets - 10 reps  Small Range Straight Leg Raise - 1 x daily - 7 x weekly - 3 sets - 10 reps    Comprehension of Education:  [x] Verbalizes understanding. [] Demonstrates understanding. [] Needs review. [x] Demonstrates/verbalizes HEP/Ed previously given. Plan: [x] Continue current frequency toward long and short term goals. [x] Specific Instructions for subsequent treatments: progress strength program and knee ROM.       Time In: 1303               Time Out: 1412     Electronically signed by:  Alma Ansari PT

## 2022-06-07 ENCOUNTER — HOSPITAL ENCOUNTER (OUTPATIENT)
Dept: PHYSICAL THERAPY | Facility: CLINIC | Age: 27
Setting detail: THERAPIES SERIES
Discharge: HOME | End: 2022-06-07

## 2022-06-07 NOTE — DISCHARGE SUMMARY
[] [address]  [address]  [phone]  F: [phone] [] [address]  [facility]   [address]  P: [phone]  F: [phone] [] [address]  [address] [facility]  P: [phone]  F: [phone] [x] [address]  [address] [address] [facility] [address]   [address]   [place]: [phone]  F: [phone]         Physical Therapy Discharge Note    Date: 2022      Patient: Adrián Amaral  : 1995  MRN: [id number]    Physician: Dr. Alcaraz Christ: [employer] ( vs)   Medical Diagnosis: Complete tear of ACL of L knee             Rehab Codes: S83.512A, M62.81, R26.89, R60.0, M79.605, M25.662  Onset date: 4/10/22                                                               Next Dr's appt. : 22  Visit# / total visits: 3/4                                    Cancels/No Shows: 0  Date of initial visit: 22                Date of final visit: 22      Subjective:  Refer to initial evaluation. Objective:  Refer to initial evaluation. Assessment: Improved quad strength and R knee ROM. No formal re-assessment as patient cancelled her last appointment and requested d/c. She is having ACL reconstruction later this month. Will return to PT post-operative.        Treatment to Date:  [x] Therapeutic Exercise    [] Modalities:  [] Therapeutic Activity    [] Ultrasound  [] Electrical Stimulation  [] Gait Training     [] Massage       [] Lumbar/Cervical Traction  [] Neuromuscular Re-education [] Cold/hotpack [] Iontophoresis: 4 mg/mL  [x] Instruction in Home Exercise Program                     Dexamethasone Sodium  [] Manual Therapy             Phosphate 40-80 mAmin  [] Aquatic Therapy                   [x] Vasocompression/    [] Other:             Game Ready    Discharge Status:     [] Pt to continue exercise/home instructions independently. [] Therapy interrupted due to:    [] Pt has 2 or more no shows/cancels, is discontinued per our policy. [x] Other: 3 therapy visits used for \"pre-habilitation\" prior to her ACL surgery. Will restart PT following her surgery on 6/23/22. Electronically signed by Kevin Page PT on 6/7/2022 at 1:31 PM      If you have any questions or concerns, please don't hesitate to call.   Thank you for your referral.

## 2022-06-07 NOTE — FLOWSHEET NOTE
[x] [facility]  Outpatient Rehabilitation &  Therapy  [facility]   [place]: [phone]  F: [phone]      Physical Therapy Cancel/No Show note    Date: 2022  Patient: Laci Redd  : 1995  MRN: [id number]    Cancels/No Shows to date:     For today's appointment patient:    [x]  Cancelled    [] Rescheduled appointment    [] No-show     Reason given by patient:    []  Patient ill    []  Conflicting appointment    [] No transportation      [] Conflict with work    [] No reason given    [] Weather related    [] COVID-19    [x] Other:      Comments:  Patient states she would like to cancel all appointments until she has her surgery.        [] Next appointment was confirmed    Electronically signed by: Flash Lemus PTA

## 2022-06-22 ENCOUNTER — ANESTHESIA EVENT (OUTPATIENT)
Dept: OPERATING ROOM | Age: 27
End: 2022-06-22
Payer: MEDICARE

## 2022-06-23 ENCOUNTER — HOSPITAL ENCOUNTER (OUTPATIENT)
Age: 27
Setting detail: OUTPATIENT SURGERY
Discharge: HOME | End: 2022-06-23
Attending: ORTHOPAEDIC SURGERY | Admitting: ORTHOPAEDIC SURGERY
Payer: MEDICARE

## 2022-06-23 ENCOUNTER — ANESTHESIA (OUTPATIENT)
Dept: OPERATING ROOM | Age: 27
End: 2022-06-23
Payer: MEDICARE

## 2022-06-23 VITALS
HEART RATE: 99 BPM | SYSTOLIC BLOOD PRESSURE: 144 MMHG | DIASTOLIC BLOOD PRESSURE: 70 MMHG | HEIGHT: 67 IN | TEMPERATURE: 97.5 F | RESPIRATION RATE: 15 BRPM | WEIGHT: 186.5 LBS | OXYGEN SATURATION: 94 % | BODY MASS INDEX: 29.27 KG/M2

## 2022-06-23 DIAGNOSIS — G89.18 ACUTE POSTOPERATIVE PAIN: Primary | ICD-10-CM

## 2022-06-23 LAB — HCG, PREGNANCY URINE (POC): NEGATIVE

## 2022-06-23 PROCEDURE — C1713 ANCHOR/SCREW BN/BN,TIS/BN: HCPCS | Performed by: ORTHOPAEDIC SURGERY

## 2022-06-23 PROCEDURE — 3600000003 HC SURGERY LEVEL 3 BASE: Performed by: ORTHOPAEDIC SURGERY

## 2022-06-23 PROCEDURE — 6360000002 HC RX W HCPCS: Performed by: NURSE ANESTHETIST, CERTIFIED REGISTERED

## 2022-06-23 PROCEDURE — 2720000010 HC SURG SUPPLY STERILE: Performed by: ORTHOPAEDIC SURGERY

## 2022-06-23 PROCEDURE — 6360000002 HC RX W HCPCS: Performed by: ORTHOPAEDIC SURGERY

## 2022-06-23 PROCEDURE — 3700000001 HC ADD 15 MINUTES (ANESTHESIA): Performed by: ORTHOPAEDIC SURGERY

## 2022-06-23 PROCEDURE — 2580000003 HC RX 258: Performed by: ORTHOPAEDIC SURGERY

## 2022-06-23 PROCEDURE — 81025 URINE PREGNANCY TEST: CPT

## 2022-06-23 PROCEDURE — 2500000003 HC RX 250 WO HCPCS: Performed by: NURSE ANESTHETIST, CERTIFIED REGISTERED

## 2022-06-23 PROCEDURE — 6360000002 HC RX W HCPCS: Performed by: ANESTHESIOLOGY

## 2022-06-23 PROCEDURE — C9290 INJ, BUPIVACAINE LIPOSOME: HCPCS | Performed by: ORTHOPAEDIC SURGERY

## 2022-06-23 PROCEDURE — 3700000000 HC ANESTHESIA ATTENDED CARE: Performed by: ORTHOPAEDIC SURGERY

## 2022-06-23 PROCEDURE — 2580000003 HC RX 258: Performed by: ANESTHESIOLOGY

## 2022-06-23 PROCEDURE — 7100000010 HC PHASE II RECOVERY - FIRST 15 MIN: Performed by: ORTHOPAEDIC SURGERY

## 2022-06-23 PROCEDURE — 2500000003 HC RX 250 WO HCPCS: Performed by: ORTHOPAEDIC SURGERY

## 2022-06-23 PROCEDURE — 7100000000 HC PACU RECOVERY - FIRST 15 MIN: Performed by: ORTHOPAEDIC SURGERY

## 2022-06-23 PROCEDURE — C1776 JOINT DEVICE (IMPLANTABLE): HCPCS | Performed by: ORTHOPAEDIC SURGERY

## 2022-06-23 PROCEDURE — 2709999900 HC NON-CHARGEABLE SUPPLY: Performed by: ORTHOPAEDIC SURGERY

## 2022-06-23 PROCEDURE — A4217 STERILE WATER/SALINE, 500 ML: HCPCS | Performed by: ORTHOPAEDIC SURGERY

## 2022-06-23 PROCEDURE — 3600000013 HC SURGERY LEVEL 3 ADDTL 15MIN: Performed by: ORTHOPAEDIC SURGERY

## 2022-06-23 PROCEDURE — 7100000011 HC PHASE II RECOVERY - ADDTL 15 MIN: Performed by: ORTHOPAEDIC SURGERY

## 2022-06-23 PROCEDURE — 7100000001 HC PACU RECOVERY - ADDTL 15 MIN: Performed by: ORTHOPAEDIC SURGERY

## 2022-06-23 DEVICE — ANCHOR SUT ANTR CRUC LIGMNT W/ FIBERTAG ATTACH BTTN SYS: Type: IMPLANTABLE DEVICE | Site: KNEE | Status: FUNCTIONAL

## 2022-06-23 DEVICE — BUTTON FIX W8XL12MM TI ATTCH SYS ALLGRFT CONSTRUCT FOR: Type: IMPLANTABLE DEVICE | Site: KNEE | Status: FUNCTIONAL

## 2022-06-23 DEVICE — ANCHOR SUT ANTR CRUC LIGMNT W/ FIBERTAG TIGHTROPE: Type: IMPLANTABLE DEVICE | Site: KNEE | Status: FUNCTIONAL

## 2022-06-23 DEVICE — FAST-FIX 360 REVERSED CURVE                                    MENISCAL REPAIR SYSTEM
Type: IMPLANTABLE DEVICE | Site: KNEE | Status: FUNCTIONAL
Brand: FAST-FIX

## 2022-06-23 RX ORDER — KETOROLAC TROMETHAMINE 30 MG/ML
INJECTION, SOLUTION INTRAMUSCULAR; INTRAVENOUS PRN
Status: DISCONTINUED | OUTPATIENT
Start: 2022-06-23 | End: 2022-06-23 | Stop reason: SDUPTHER

## 2022-06-23 RX ORDER — MIDAZOLAM HYDROCHLORIDE 1 MG/ML
INJECTION INTRAMUSCULAR; INTRAVENOUS PRN
Status: DISCONTINUED | OUTPATIENT
Start: 2022-06-23 | End: 2022-06-23 | Stop reason: SDUPTHER

## 2022-06-23 RX ORDER — SODIUM CHLORIDE, SODIUM LACTATE, POTASSIUM CHLORIDE, CALCIUM CHLORIDE 600; 310; 30; 20 MG/100ML; MG/100ML; MG/100ML; MG/100ML
INJECTION, SOLUTION INTRAVENOUS CONTINUOUS
Status: DISCONTINUED | OUTPATIENT
Start: 2022-06-23 | End: 2022-06-23 | Stop reason: HOSPADM

## 2022-06-23 RX ORDER — SODIUM CHLORIDE 9 MG/ML
INJECTION, SOLUTION INTRAVENOUS PRN
Status: DISCONTINUED | OUTPATIENT
Start: 2022-06-23 | End: 2022-06-23 | Stop reason: HOSPADM

## 2022-06-23 RX ORDER — HYDROMORPHONE HCL 110MG/55ML
PATIENT CONTROLLED ANALGESIA SYRINGE INTRAVENOUS PRN
Status: DISCONTINUED | OUTPATIENT
Start: 2022-06-23 | End: 2022-06-23 | Stop reason: SDUPTHER

## 2022-06-23 RX ORDER — ONDANSETRON 2 MG/ML
INJECTION INTRAMUSCULAR; INTRAVENOUS PRN
Status: DISCONTINUED | OUTPATIENT
Start: 2022-06-23 | End: 2022-06-23 | Stop reason: SDUPTHER

## 2022-06-23 RX ORDER — ONDANSETRON 4 MG/1
4 TABLET, FILM COATED ORAL EVERY 6 HOURS PRN
Qty: 30 TABLET | Refills: 1 | Status: SHIPPED | OUTPATIENT
Start: 2022-06-23

## 2022-06-23 RX ORDER — LIDOCAINE HYDROCHLORIDE 10 MG/ML
1 INJECTION, SOLUTION EPIDURAL; INFILTRATION; INTRACAUDAL; PERINEURAL
Status: DISCONTINUED | OUTPATIENT
Start: 2022-06-23 | End: 2022-06-23 | Stop reason: HOSPADM

## 2022-06-23 RX ORDER — DEXAMETHASONE SODIUM PHOSPHATE 10 MG/ML
INJECTION, SOLUTION INTRAMUSCULAR; INTRAVENOUS PRN
Status: DISCONTINUED | OUTPATIENT
Start: 2022-06-23 | End: 2022-06-23 | Stop reason: SDUPTHER

## 2022-06-23 RX ORDER — DOCUSATE SODIUM 100 MG/1
100 CAPSULE, LIQUID FILLED ORAL 2 TIMES DAILY
Qty: 30 CAPSULE | Refills: 0 | Status: SHIPPED | OUTPATIENT
Start: 2022-06-23

## 2022-06-23 RX ORDER — ASPIRIN 81 MG/1
81 TABLET ORAL DAILY
Qty: 14 TABLET | Refills: 0 | Status: SHIPPED | OUTPATIENT
Start: 2022-06-23 | End: 2022-07-07

## 2022-06-23 RX ORDER — LIDOCAINE HYDROCHLORIDE 20 MG/ML
INJECTION, SOLUTION EPIDURAL; INFILTRATION; INTRACAUDAL; PERINEURAL PRN
Status: DISCONTINUED | OUTPATIENT
Start: 2022-06-23 | End: 2022-06-23 | Stop reason: SDUPTHER

## 2022-06-23 RX ORDER — FENTANYL CITRATE 50 UG/ML
50 INJECTION, SOLUTION INTRAMUSCULAR; INTRAVENOUS EVERY 5 MIN PRN
Status: DISCONTINUED | OUTPATIENT
Start: 2022-06-23 | End: 2022-06-23 | Stop reason: HOSPADM

## 2022-06-23 RX ORDER — VANCOMYCIN HYDROCHLORIDE 1 G/20ML
INJECTION, POWDER, LYOPHILIZED, FOR SOLUTION INTRAVENOUS
Status: DISCONTINUED
Start: 2022-06-23 | End: 2022-06-23 | Stop reason: HOSPADM

## 2022-06-23 RX ORDER — OXYCODONE HYDROCHLORIDE 5 MG/1
5 TABLET ORAL
Status: DISCONTINUED | OUTPATIENT
Start: 2022-06-23 | End: 2022-06-23 | Stop reason: HOSPADM

## 2022-06-23 RX ORDER — GENTAMICIN SULFATE 40 MG/ML
INJECTION, SOLUTION INTRAMUSCULAR; INTRAVENOUS
Status: DISCONTINUED
Start: 2022-06-23 | End: 2022-06-23 | Stop reason: HOSPADM

## 2022-06-23 RX ORDER — ONDANSETRON 2 MG/ML
4 INJECTION INTRAMUSCULAR; INTRAVENOUS
Status: DISCONTINUED | OUTPATIENT
Start: 2022-06-23 | End: 2022-06-23 | Stop reason: HOSPADM

## 2022-06-23 RX ORDER — FENTANYL CITRATE 50 UG/ML
INJECTION, SOLUTION INTRAMUSCULAR; INTRAVENOUS PRN
Status: DISCONTINUED | OUTPATIENT
Start: 2022-06-23 | End: 2022-06-23 | Stop reason: SDUPTHER

## 2022-06-23 RX ORDER — FENTANYL CITRATE 50 UG/ML
25 INJECTION, SOLUTION INTRAMUSCULAR; INTRAVENOUS EVERY 5 MIN PRN
Status: DISCONTINUED | OUTPATIENT
Start: 2022-06-23 | End: 2022-06-23 | Stop reason: HOSPADM

## 2022-06-23 RX ORDER — OXYCODONE HYDROCHLORIDE AND ACETAMINOPHEN 5; 325 MG/1; MG/1
1-2 TABLET ORAL EVERY 4 HOURS PRN
Qty: 50 TABLET | Refills: 0 | Status: SHIPPED | OUTPATIENT
Start: 2022-06-23 | End: 2022-06-30

## 2022-06-23 RX ORDER — SODIUM CHLORIDE 0.9 % (FLUSH) 0.9 %
5-40 SYRINGE (ML) INJECTION EVERY 12 HOURS SCHEDULED
Status: DISCONTINUED | OUTPATIENT
Start: 2022-06-23 | End: 2022-06-23 | Stop reason: HOSPADM

## 2022-06-23 RX ORDER — PROPOFOL 10 MG/ML
INJECTION, EMULSION INTRAVENOUS PRN
Status: DISCONTINUED | OUTPATIENT
Start: 2022-06-23 | End: 2022-06-23 | Stop reason: SDUPTHER

## 2022-06-23 RX ORDER — SODIUM CHLORIDE 0.9 % (FLUSH) 0.9 %
5-40 SYRINGE (ML) INJECTION PRN
Status: DISCONTINUED | OUTPATIENT
Start: 2022-06-23 | End: 2022-06-23 | Stop reason: HOSPADM

## 2022-06-23 RX ORDER — SODIUM CHLORIDE 9 MG/ML
INJECTION INTRAVENOUS
Status: DISCONTINUED
Start: 2022-06-23 | End: 2022-06-23 | Stop reason: HOSPADM

## 2022-06-23 RX ORDER — TRAMADOL HYDROCHLORIDE 50 MG/1
TABLET ORAL
COMMUNITY
Start: 2022-06-02

## 2022-06-23 RX ORDER — SODIUM CHLORIDE 9 MG/ML
INJECTION, SOLUTION INTRAVENOUS CONTINUOUS
Status: DISCONTINUED | OUTPATIENT
Start: 2022-06-23 | End: 2022-06-23 | Stop reason: HOSPADM

## 2022-06-23 RX ADMIN — ONDANSETRON 4 MG: 2 INJECTION INTRAMUSCULAR; INTRAVENOUS at 12:06

## 2022-06-23 RX ADMIN — FENTANYL CITRATE 50 MCG: 50 INJECTION, SOLUTION INTRAMUSCULAR; INTRAVENOUS at 13:27

## 2022-06-23 RX ADMIN — PROPOFOL 200 MG: 10 INJECTION, EMULSION INTRAVENOUS at 09:38

## 2022-06-23 RX ADMIN — HYDROMORPHONE HYDROCHLORIDE 0.4 MG: 2 INJECTION INTRAMUSCULAR; INTRAVENOUS; SUBCUTANEOUS at 10:16

## 2022-06-23 RX ADMIN — SODIUM CHLORIDE, POTASSIUM CHLORIDE, SODIUM LACTATE AND CALCIUM CHLORIDE: 600; 310; 30; 20 INJECTION, SOLUTION INTRAVENOUS at 06:54

## 2022-06-23 RX ADMIN — HYDROMORPHONE HYDROCHLORIDE 0.4 MG: 2 INJECTION INTRAMUSCULAR; INTRAVENOUS; SUBCUTANEOUS at 09:57

## 2022-06-23 RX ADMIN — MIDAZOLAM 2 MG: 1 INJECTION INTRAMUSCULAR; INTRAVENOUS at 09:34

## 2022-06-23 RX ADMIN — DEXAMETHASONE SODIUM PHOSPHATE 10 MG: 10 INJECTION, SOLUTION INTRAMUSCULAR; INTRAVENOUS at 09:44

## 2022-06-23 RX ADMIN — Medication 100 MCG: at 09:38

## 2022-06-23 RX ADMIN — HYDROMORPHONE HYDROCHLORIDE 0.4 MG: 2 INJECTION INTRAMUSCULAR; INTRAVENOUS; SUBCUTANEOUS at 10:39

## 2022-06-23 RX ADMIN — LIDOCAINE HYDROCHLORIDE 100 MG: 20 INJECTION, SOLUTION EPIDURAL; INFILTRATION; INTRACAUDAL; PERINEURAL at 09:38

## 2022-06-23 RX ADMIN — FENTANYL CITRATE 50 MCG: 50 INJECTION, SOLUTION INTRAMUSCULAR; INTRAVENOUS at 13:17

## 2022-06-23 RX ADMIN — CEFAZOLIN SODIUM 2000 MG: 10 INJECTION, POWDER, FOR SOLUTION INTRAVENOUS at 09:43

## 2022-06-23 RX ADMIN — KETOROLAC TROMETHAMINE 30 MG: 30 INJECTION, SOLUTION INTRAMUSCULAR at 12:07

## 2022-06-23 RX ADMIN — HYDROMORPHONE HYDROCHLORIDE 0.2 MG: 2 INJECTION INTRAMUSCULAR; INTRAVENOUS; SUBCUTANEOUS at 10:30

## 2022-06-23 RX ADMIN — SODIUM CHLORIDE, POTASSIUM CHLORIDE, SODIUM LACTATE AND CALCIUM CHLORIDE: 600; 310; 30; 20 INJECTION, SOLUTION INTRAVENOUS at 12:27

## 2022-06-23 ASSESSMENT — PAIN SCALES - GENERAL
PAINLEVEL_OUTOF10: 7
PAINLEVEL_OUTOF10: 6
PAINLEVEL_OUTOF10: 8
PAINLEVEL_OUTOF10: 10

## 2022-06-23 ASSESSMENT — PAIN DESCRIPTION - PAIN TYPE: TYPE: SURGICAL PAIN

## 2022-06-23 ASSESSMENT — PAIN - FUNCTIONAL ASSESSMENT: PAIN_FUNCTIONAL_ASSESSMENT: 0-10

## 2022-06-23 ASSESSMENT — PAIN DESCRIPTION - DESCRIPTORS
DESCRIPTORS: SHARP
DESCRIPTORS: ACHING
DESCRIPTORS: SHARP
DESCRIPTORS: SHARP

## 2022-06-23 ASSESSMENT — PAIN DESCRIPTION - FREQUENCY: FREQUENCY: CONTINUOUS

## 2022-06-23 ASSESSMENT — PAIN DESCRIPTION - ORIENTATION
ORIENTATION: LEFT

## 2022-06-23 ASSESSMENT — PAIN DESCRIPTION - ONSET: ONSET: ON-GOING

## 2022-06-23 ASSESSMENT — PAIN DESCRIPTION - LOCATION
LOCATION: KNEE

## 2022-06-23 NOTE — ANESTHESIA PRE PROCEDURE
Department of Anesthesiology  Preprocedure Note       Name:  Milan Holden   Age:  31 y.o.  :  1995                                          MRN:  [id number]         Date:  2022      Surgeon: Dorian Mcrae):  Sunita Song MD    Procedure: Procedure(s):  LEFT KNEE ACL ARTHROSCOPIC  RECONSTRUCTION QUADRICEP TENDON AUTOGRAFT AND LATERAL MENISCUS REPAIR- ARTHREX MENISCAL STITCHER SET AND NEEDLES ,MENISCAL RASP,HENING RETRACTORS, S/N FAST     Medications prior to admission:   Prior to Admission medications    Medication Sig Start Date End Date Taking? Authorizing Provider   traMADol (ULTRAM) 50 MG tablet TAKE 1-2 TABS BY MOUTH EVERY 4-6 HOURS AS NEEDED 22   Historical Provider, MD   ibuprofen (IBU) 600 MG tablet Take 1 tablet by mouth every 6 hours as needed for Pain 4/10/22   Linnea Fox PA-C   naproxen (NAPROSYN) 500 MG tablet Take 1 tablet by mouth 2 times daily as needed for Pain 21  Genny Dickens DO       Current medications:    Current Facility-Administered Medications   Medication Dose Route Frequency Provider Last Rate Last Admin    ceFAZolin (ANCEF) 2000 mg in dextrose 5 % 50 mL IVPB  2,000 mg IntraVENous Once Sunita Song MD        lidocaine PF 1 % injection 1 mL  1 mL IntraDERmal Once PRN Sonia Schulz MD        0.9 % sodium chloride infusion   IntraVENous Continuous Ndal Dolly Lu MD        lactated ringers infusion   IntraVENous Continuous Sonia Schulz  mL/hr at 22 0654 New Bag at 22 0654    sodium chloride flush 0.9 % injection 5-40 mL  5-40 mL IntraVENous 2 times per day Sonia Schulz MD        sodium chloride flush 0.9 % injection 5-40 mL  5-40 mL IntraVENous PRN Sonia Schulz MD        0.9 % sodium chloride infusion   IntraVENous PRN Sonia Schulz MD           Allergies:  No Known Allergies    Problem List:  There is no problem list on file for this patient.       Past Medical History:        Diagnosis Date    Murmur, heart     as child       Past Surgical History:        Procedure Laterality Date    HAND SURGERY Right 08/09/2021    RLF,RRF Nerve repair Dr. Kiser Husbands History:    Social History     Tobacco Use    Smoking status: Never Smoker    Smokeless tobacco: Never Used   Substance Use Topics    Alcohol use: Yes     Comment: \"almost everyday\"                                Counseling given: Not Answered      Vital Signs (Current):   Vitals:    06/23/22 0631 06/23/22 0638   BP:  (!) 140/98   Pulse:  88   Resp:  16   Temp:  98.1 °F (36.7 °C)   SpO2:  96%   Weight: 186 lb 8 oz (84.6 kg)    Height: 5' 7\" (1.702 m)                                               BP Readings from Last 3 Encounters:   06/23/22 (!) 140/98   04/10/22 127/81   03/09/22 121/83       NPO Status: Time of last liquid consumption: 2359                        Time of last solid consumption: 2359                        Date of last liquid consumption: 06/22/22                        Date of last solid food consumption: 06/22/22    BMI:   Wt Readings from Last 3 Encounters:   06/23/22 186 lb 8 oz (84.6 kg)   04/10/22 175 lb (79.4 kg)   03/09/22 155 lb (70.3 kg)     Body mass index is 29.21 kg/m². CBC: No results found for: WBC, RBC, HGB, HCT, MCV, RDW, PLT    CMP: No results found for: NA, K, CL, CO2, BUN, CREATININE, GFRAA, AGRATIO, LABGLOM, GLUCOSE, GLU, PROT, CALCIUM, BILITOT, ALKPHOS, AST, ALT    POC Tests: No results for input(s): POCGLU, POCNA, POCK, POCCL, POCBUN, POCHEMO, POCHCT in the last 72 hours.     Coags: No results found for: PROTIME, INR, APTT    HCG (If Applicable):   Lab Results   Component Value Date    HCG NEGATIVE 06/23/2022        ABGs: No results found for: PHART, PO2ART, GXQ1ADV, DFY7FTO, BEART, C6FPSCAO     Type & Screen (If Applicable):  No results found for: LABABO, LABRH    Drug/Infectious Status (If Applicable):  No results found for: HIV, HEPCAB    COVID-19 Screening (If Applicable): No results found for: COVID19        Anesthesia Evaluation    Airway: Mallampati: I  TM distance: >3 FB   Neck ROM: full  Mouth opening: > = 3 FB   Dental:          Pulmonary:                              Cardiovascular:                      Neuro/Psych:               GI/Hepatic/Renal:             Endo/Other:                     Abdominal:             Vascular:           Other Findings:           Anesthesia Plan      general     ASA 1                                   Cortez Lundborg, MD   6/23/2022

## 2022-06-23 NOTE — ANESTHESIA POSTPROCEDURE EVALUATION
Department of Anesthesiology  Postprocedure Note    Patient: Usama Delgado  MRN: [id number]  YOB: 1995  Date of evaluation: 6/23/2022      Procedure Summary     Date: 06/23/22 Room / Location: [address] [place] [address] Place / [facility] [place]    Anesthesia Start: 8495 Anesthesia Stop: 3608    Procedure: LEFT KNEE ACL ARTHROSCOPIC  RECONSTRUCTION QUADRICEP TENDON AUTOGRAFT WITH MEDIAL AND LATERAL MENISCUS REPAIR (Left Knee) Diagnosis:       Rupture of anterior cruciate ligament of left knee, initial encounter      Acute lateral meniscus tear of left knee, initial encounter      (DX COMPLETE TEAR ACL LEFT KNEE)      (LATERAL MENISCUS TEAR LEFT KNEE)    Surgeons: Eliel Parks MD Responsible Provider: Jenny Zepeda MD    Anesthesia Type: general ASA Status: 1          Anesthesia Type: general    Blue Phase I: Blue Score: 8    Blue Phase II: Blue Score: 9      Anesthesia Post Evaluation    Complications: no

## 2022-06-23 NOTE — H&P
Interval H&P Note    Pt Name: Milan Holden  MRN: [id number]  YOB: 1995  Date of evaluation: 6/23/2022      [x] I have reviewed the hardcopy Orthopedic Progress Note by Dr Sunita Song dated 6/6/22 labeled in short chart for an Interval History and Physical note. [x] I have examined  Mialn Holden  The patient arrived for his scheduled LEFT KNEE ACL ARTHROSCOPIC  RECONSTRUCTION QUADRICEP TENDON AUTOGRAFT AND LATERAL MENISCUS REPAIR- ARTHREX MENISCAL STITCHER SET AND NEEDLES ,MENISCAL RASP,HENING RETRACTORS, S/N FAST  by Sunita Song MD for DX COMPLETE TEAR ACL LEFT KNEE LATERAL MENISCUS TEAR LEFT KNEE. The patient denies new health changes, fever, chills, wheezing, cough, increased SOB, chest pain, open sores or wounds. No DM  Last ibuprofen 6/19/22     Past Medical History:     Past Medical History:   Diagnosis Date    Murmur, heart     as child        Past Surgical History:     Past Surgical History:   Procedure Laterality Date    HAND SURGERY Right 08/09/2021    RLF,RRF Nerve repair Dr. Vanessa Ceron History:     Tobacco:    reports that she has never smoked. She has never used smokeless tobacco.  Alcohol:      reports current alcohol use. Drug Use:  reports previous drug use. Drug: Marijuana [place]). Family History:     History reviewed. No pertinent family history. Vital signs: BP (!) 140/98   Pulse 88   Temp 98.1 °F (36.7 °C)   Resp 16   Ht 5' 7\" (1.702 m)   Wt 186 lb 8 oz (84.6 kg)   SpO2 96%   BMI 29.21 kg/m²     Allergies:  Patient has no known allergies. Medications:    Prior to Admission medications    Medication Sig Start Date End Date Taking?  Authorizing Provider   traMADol (ULTRAM) 50 MG tablet TAKE 1-2 TABS BY MOUTH EVERY 4-6 HOURS AS NEEDED 6/2/22   Historical Provider, MD   ibuprofen (IBU) 600 MG tablet Take 1 tablet by mouth every 6 hours as needed for Pain 4/10/22   Linnea Fox PA-C   naproxen (NAPROSYN) 500 MG tablet Take 1 tablet by mouth 2 times daily as needed for Pain 8/25/21 9/1/21  Nata Allan 1721, DO         This is a 31 y.o. female who is pleasant, cooperative, alert and oriented x3, in no acute distress. Heart: Heart sounds are normal.  HR 88 regular rate and rhythm with soft systlic murmur I/VI, gallop or rub. Lungs: Normal respiratory effort with equal expansion, good air exchange, unlabored and clear to auscultation without wheezes or rales bilaterally   Abdomen: soft, nontender, nondistended with bowel sounds . Labs:  Recent Labs     06/23/22  0639   HCG NEGATIVE       No results for input(s): COVID19 in the last 720 hours.     SHERINE Degroot CNP  Electronically signed 6/23/2022 at 7:15 AM

## 2022-06-23 NOTE — OP NOTE
Operative Note      Patient: Justina Sandifer  YOB: 1995  MRN: [id number]    Date of Procedure: 6/23/2022    Pre-Op Diagnosis: DX COMPLETE TEAR ACL LEFT KNEE  LATERAL MENISCUS TEAR LEFT KNEE    Post-Op Diagnosis: Complete tear of the ACL with medial and lateral meniscus tears       Procedure(s):  LEFT KNEE ACL ARTHROSCOPIC  RECONSTRUCTION QUADRICEP TENDON AUTOGRAFT WITH MEDIAL AND LATERAL MENISCUS REPAIR    Surgeon(s):  Jess Massey MD    Assistant:   Resident: Kaveh Queen MD    Anesthesia: General    Estimated Blood Loss (mL): less than 50     Complications: None    Specimens:   * No specimens in log *    Implants:  Implant Name Type Inv. Item Serial No.  Lot No. LRB No. Used Action   NEEDLE SUT REV FOR DEL MENISCI REP SYS FAST- - [id number]  NEEDLE SUT REV FOR DEL MENISCI REP SYS FAST-  [employer]-WD [id number] Left 1 Implanted   ANCHOR SUT ANTR CRUC LIGMNT W/ FIBERTAG ATTACH BTTN SYS - [id number]  ANCHOR SUT ANTR CRUC LIGMNT W/ FIBERTAG ATTACH BTTN SYS  [employer]WD [id number] Left 1 Implanted   ANCHOR SUT ANTR CRUC LIGMNT W/ [employer] - [id number]  ANCHOR SUT ANTR CRUC LIGMNT W/ [employer]  [address] [employer]-WD [id number] Left 1 Implanted   BUTTON FIX [id number] TI ATTCH SYS ALLGRFT CONSTRUCT FOR - [id number]  BUTTON FIX [id number] TI ATTCH SYS ALLGRFT CONSTRUCT FOR  [employer]WD [id number] Left 1 Implanted         Drains: * No LDAs found *    Findings: Complete tear of the anterior cruciate ligaments with a vertical tear posterior horn medial meniscus at the meniscocapsular junction. There was a complex tear of the posterior horn lateral meniscus. Detailed Description of Procedure:   Is a 79-year-old female sustained an injury to her left knee. The patient was found to have a tear of her ACL and lateral meniscus preoperatively. This felt that she would benefit from operative intervention.   After informed consent was obtained the patient is brought to the operating room placed supine the operative table placed under general anesthesia. After the patient is placed under anesthesia a stockinette and tourniquet were placed around the left proximal thigh. Her leg was placed in arthroscopic leg snyder. The right leg was placed in a yellowfin snyder and placed in a slightly flexed and abducted position. She did have a positive pivot and a positive Lachman. Once her leg was in the arthroscopic snyder the foot of the table was dropped and the leg was cleaned with a chlorhexidine soap and dried and prepared with a ChloraPrep solution of intermittent drying time was draped in a sterile fashion. A timeout was performed. After timeout was performed an Esmarch was used to exsanguinate the extremity and the tourniquet was elevated to 250. With the tourniquet up a 3-1/2 cm incision was created at the superior aspect of the patella. Dissection was carried down to the quadriceps tendon. Fat pad overlying the quadriceps was removed. A size 10 sizer guide was placed on the quad and a 10 x 65 mm graft was harvested from the tendinous portion of our quadriceps tendon. After this was harvested the rent in the tendon was closed with a #2 Vicryl suture. The tendon was then prepared on the back table for an all inside ACL reconstruction using [employer] equipment. After the graft was prepared on the back table was sized at a 9-1/2 and placed in a graft tube and placed under tension. While doing the knee arthroscopy a standard anterior lateral arthroscopic portal was created and diagnostic arthroscopy the joint showed the patient had evidence of tearing in the mid substance of the ACL. She had a tear of the posterior horn medial meniscus as well as a complex tear of the root and posterior horn lateral meniscus. A meniscal rasp was used to rasp the synovium both medially and laterally.   A FasT-Fix suture device in a horizontal mattress configuration was used to fix the medial meniscus. Only 1 suture was needed. Looking at the lateral meniscus there was a displaced flap into the notch which was debrided using our shaver. The remainder of the meniscus had complex tear configuration. A circumferential stitch using the nova stitch device from since that [employer] was placed repairing the posterior horn lateral meniscus. After that was done the ACL was removed in its entirety. An outside in guide for our flip cutter device was used for our femoral tunnel. Chambers Von was made on the lateral thigh an incision was created through the skin to the IT band onto the lateral femur. Our guide was placed directly on the bone and our tunnel was drilled. Our tunnel measured approximately 30 mm in depth. Our graft measured 9-1/2 in diameter so a 9-1/2 x 23 mm tunnel was created in the femur. We then used our guide for our ACL tunnel on the tibia. A venkatesh was placed on the medial tibia and incision was created down to the tibial plateau region. With our tibial guide set at 60 a pin was placed in the anatomic attachment site. We then placed our flip cutter and a 9-1/2 tunnel was created on her tibia as well. After that was created any bone was removed from the knee. We then placed passing sutures in both tunnels and through the medial arthroscopic portal we were able to pass our quadriceps graft first into the femoral tunnel to a depth of 10. We then passed the tibia down into our tibial tunnel. Once both grafts are in the tunnels we made sure that we had approximately 15 mm of graft in each tunnel. The knee was brought into full extension and the grafts were fully tensioned in this position. Our Lachman was restored back to normal.  We had excellent range of motion at the completion of the case. Final pictures of her graft were taken. The arthroscopic portal sites were closed with a 3-0 nylon suture. The other incisions were closed in layers and glue was placed.   The skin

## 2022-06-23 NOTE — H&P
History and Physical Update    Pt Name: Marty Turpin  MRN: [id number]  YOB: 1995  Date of evaluation: 6/23/2022    [x] I have examined the patient and reviewed the H&P/Consult and there are no changes to the patient or plans.     [] I have examined the patient and reviewed the H&P/Consult and have noted the following changes:        Kasia Vinson MD   Electronically signed 6/23/2022 at 7:15 AM

## 2022-07-20 ENCOUNTER — HOSPITAL ENCOUNTER (OUTPATIENT)
Dept: PHYSICAL THERAPY | Facility: CLINIC | Age: 27
Setting detail: THERAPIES SERIES
Discharge: HOME | End: 2022-07-20
Payer: MEDICARE

## 2022-07-20 PROCEDURE — 97161 PT EVAL LOW COMPLEX 20 MIN: CPT

## 2022-07-20 PROCEDURE — 97016 VASOPNEUMATIC DEVICE THERAPY: CPT

## 2022-07-20 PROCEDURE — 97110 THERAPEUTIC EXERCISES: CPT

## 2022-07-20 NOTE — CONSULTS
at this time. PMHx: [x] Unremarkable [] Diabetes [] HTN  [] Pacemaker   [] MI/Heart Problems   [] Cancer   [] Arthritis [] Other:              [x] Refer to full medical chart  In EPIC       Comorbidities:   [] Obesity [] Dialysis  [] N/A   [] Asthma/COPD [] Dementia [x] Other: (R) ACL repair   [] Stroke [] Sleep apnea [] Other:   [] Vascular disease [] Rheumatic disease [] Other:     Tests: [] X-Ray: [x] MRI: 4/18/22  [] Other:     Impression   1. Complete ACL tear with kissing bone contusion pattern and large joint   effusion. Probable nondisplaced microtrabecular fracturing in the regions of   the kissing bone contusions. 2. Complex multidirectional tearing throughout the substance of the lateral   meniscus. 3. Grade 2 MCL injury. Grade 1 LCL injury. 4. Mild edema in Hoffa's fat. 5. Moderate edema in the subcutaneous fat about the knee. 6. Moderate patellar tendinosis. Mild distal quadriceps tendinosis. 7. 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 Medications: [x] Refer to full medical record [] None [] Other:  Allergies:      [x] Refer to full medical record [] None [] Other:    Function:  Hand Dominance  [] Right  [] Left  Employer    Job Status []  Normal duty   [] Light duty   [] Off due to condition    []  Retired   [x] Not employed   [] Disability  [] Other:  []  Return to work:    Work activities/duties        ADL/IADL Previous level of function Current level of function Who currently assists the patient with task   Bathing  [x] Independent  [] Assist [x] Independent  [] Assist    Dress/grooming [x] Independent  [] Assist [x] Independent  [] Assist    Transfer/mobility [x] Independent  [] Assist [x] Independent  [] Assist Patient states she can ambulate up to 5 min without increased pain   Feeding [x] Independent  [] Assist [x] Independent  [] Assist    Toileting [x] Independent  [] Assist [x] Independent  [] Assist    Driving [x] Independent  [] Assist [] Independent  [x] Assist    Housekeeping [x] Independent  [] Assist [] Independent  [x] Assist    Grocery shop/meal prep [x] Independent  [] Assist [] Independent  [x] Assist      Gait Prior level of function Current level of function    [x] Independent  [] Assist [x] Independent  [] Assist   Device: [x] Independent [x] Independent    [] Straight Cane [] Quad cane [] Straight Cane [] Quad cane    [] Standard walker [] Rolling walker   [] 4 wheeled walker [] Standard walker [] Rolling walker   [] 4 wheeled walker [x] Crutches    [] Wheelchair [] Wheelchair     Pain:  [x] Yes  [] No Location: (L) knee Pain Rating: (0-10 scale) 3-4/10  Pain altered Tx:  [] Yes  [x] No  Action:    Symptoms:  [x] Improving [] Worsening [] Same  Better:  [] AM    [] PM    [] Sit    [] Rise/Sit    []Stand    [] Walk    [] Lying    [x] Other: Knee flexion, pain medication as needed, ice  Worse: [] AM    [] PM    [] Sit    [] Rise/Sit    []Stand    [] Walk    [] Lying    [] Bend                      [] Valsalva    [x] Other: knee extension,   Sleep: [] OK    [x] Disturbed- tosses and turns due to pain    Objective:    ROM  ° A/P STRENGTH    Left Right Left Right   Hip Flex   5 5   Ext       ER       IR       ABD       ADD       Knee Flex 90 120 2 5   Ext -3 0 2 5   Ankle DF 5 10 5 5   PF 60 60 5 5   INV       EVER                  OBSERVATION No Deficit Deficit Not Tested Comments   Posture       Forward Head [] [x] []    Rounded Shoulders [] [x] []    Iliac Crest [x] [] []    PSIS [x] [] []    ASIS [x] [] []    Genu Valgus [] [x] []    Genu Varus [x] [] []    Genu Recurvatum [x] [] []    Pronation [] [x] []    Supination [x] [] []    Palpation [] [x] [] Mild TTP at patella   Sensation [] [x] [] Numbness noted on patella   Edema [] [] [x] (R) at JL: 33.5 cm, above knee: 41 deg     (L) at JL: 34.5 deg  Above knee: 42 deg   Neurological [] [] [x]    Patellar Mobility [x] [] []    Patellar Orientation [x] [] []    Gait [] [x] [] Analysis: Patient ambulates  with (B) crutches and swing through gait pattern. FUNCTION Normal Difficult Unable   Sitting [x] [] []   Standing [x] [] []   Ambulation [] [x] []   Groom/Dress [] [x] []   Lift/Carry [] [] [x]   Stairs [] [x] []   Bending [] [x] []   Squat [] [] [x]   Kneel [] [] [x]     BALANCE/PROPRIOCEPTION              [x] Not tested   Single leg stance       R                     L                                PAIN   Eyes open                         Sec. Sec                  . [x]    Eyes closed                          Sec. Sec                  . []        FUNCTIONAL TESTS PAIN NO PAIN COMMENTS   30 sec STS [] []    Squat [] []      Functional Test: LEFS Score: 74% functionally impaired     Comments:    Assessment:  Patient is a 31year old female who presents to physical therapy s/p (L) ACL and mensicus reconstruction. Patient demonstrates impairments in pain tolerance, (L) knee AROM, (L) ankle DF AROM, (L) LE strength, balance and gait mechanics. These impairments affect the patient's ability to complete ADLs, household related tasks and return to work. Patient would benefit from skilled physical therapy in order to improve impairments, improve overall quality of life and increase ease of working. Educated patient on evaluation findings and poc. Educated patient on protocol at this time. Educated patient on TTWB precautions until 6 weeks and discussed that patient should not be attempting to weight bear through (L) LE. Discussed that patient should only complete heel slides to 90 deg at this time. Discussed that patient should not be driving at this time for safety of patient and to prevent future further injury. Discussed that patient should negotiate stairs by going up with the (R) LE and down with the (L) LE. Patient verbalized good understanding of all education. Problems:    [x] ? Pain:  [x] ? ROM:  [x] ? Strength:  [x] ? restrictions) [x] 1-2 [] 3 [] 4+   Clinical presentation (progression) [x] Stable [] Evolving  [] Unstable   Decision Making [x] Low [] Moderate [] High    [x] Low Complexity [] Moderate Complexity [] High Complexity       Treatment Charges: Mins Units   [x] Evaluation       [x]  Low       []  Moderate       []  High 33 1   []  Modalities     [x]  Ther Exercise 10 1   []  Manual Therapy     []  Ther Activities     []  Aquatics     [x]  Vasocompression 10 1   []  Other       TOTAL TREATMENT TIME: 53 min    Time in: 3:08 pm   Time Out: 4:10 pm    Electronically signed by: Royal Morales PT        Physician Signature:________________________________Date:__________________  By signing above or cosigning this note, I have reviewed this plan of care and certify a need for medically necessary rehabilitation services.      *PLEASE SIGN ABOVE AND FAX BACK ALL PAGES*

## 2022-07-22 ENCOUNTER — HOSPITAL ENCOUNTER (OUTPATIENT)
Dept: PHYSICAL THERAPY | Facility: CLINIC | Age: 27
Setting detail: THERAPIES SERIES
Discharge: HOME | End: 2022-07-22
Payer: MEDICARE

## 2022-07-22 PROCEDURE — 97110 THERAPEUTIC EXERCISES: CPT

## 2022-07-22 PROCEDURE — 97016 VASOPNEUMATIC DEVICE THERAPY: CPT

## 2022-07-22 NOTE — FLOWSHEET NOTE
[] [address]  - [facility]ELVE[facility] CENTER &  Therapy  [address]  [phone]  F: [phone] [] [address]  [facility]   [address]  P: [phone]  F: [phone] [x] [address]  [address]  P: [phone]  F: [phone] [] [address]  P: [phone]  F: [phone] [] [address]  [facility]   [address]   [place]: [phone]  F: [phone]      Physical Therapy Daily Treatment Note    Date:  2022  Patient Name:  Roxann Smith    :  1995  MRN: [id number]  Physician: Tre Daniels MD                                    Insurance: [place] [employer] ( visits approved)   Medical Diagnosis:   Diagnosis   G89.18 (ICD-10-CM) - Acute postoperative pain                             Rehab Codes: M25.562, M25.662, M25.462, R26.89, M62.81, R20.2  Onset date: 22 sx              Next Dr's appt.: PRN  Visit# / total visits:     Cancels/No Shows: 0/0    Subjective: PT arrives this morning noting that the L knee is painful today. Felt the knee was a little more swollen after her initial visit. Does mention that she forgot her brace today. Pain:  [x] Yes  [] No Location: L knee Pain Rating: (0-10 scale) 7/10  Pain altered Tx:  [x] No  [] Yes  Action:  Comments:    Objective:       Todays Treatment:  Modalities: Vasocompression: L knee medium pressure 15' post ex   Precautions: (L) ACL protocol phase 2, TTWB for 6 weeks (), 0-90 deg knee flexion for 6 weeks  Exercise: L knee  Reps/ Time Weight/ Level Comments   Nustep 8'  L2               SB S 30\"x3    seated today   HS S 30\"x3    seated today             Seated Heel Slide x20       Seated Hip flexion x20                 Quad set 5\"x20       SLR 2x10 SL hip abd x15       Prone quad set 15x5\"                     Other:     Specific Instructions for next treatment: progress closed chain ex per protocol, ice and elevate,  E-stim as needed       Treatment Charges: Mins Units   []  Modalities     [x]  Ther Exercise 25 2   []  Manual Therapy     []  Ther Activities     []  Aquatics     [x]  Vasocompression 15 1   []  Other     Total Treatment time 40 3       Assessment: [x] Progressing toward goals. Reviewed and completed stretches prior to use of Nustep d/t machines in use. Stretches completed in sitting d/t no brace this date. Good tolerance to nustep warm up. Completed mat strengthening ex as noted. Very minor quad leg with SLR. LLE fatigues quickly. Verbal reminder to keep knee flexion to 90°. Applied vaso post ex for post ex pain control. Will continue to progress per protocol. [] No change. [] Other:  [x] Patient would continue to benefit from skilled physical therapy services in order to: restore L knee ROM and strength to allow pt to return to PLOF. STG: (to be met in 8 treatments)  ? Pain: Patient will report pain as 1/10  ? ROM: Patient will improve (L) knee AROM to 0-110 deg in order to improve gait mechanics  Patient will improve (L) ankle AROM to 10-60 deg in order to indicate improved gastroc length and improve gait mechanics   ? Function: Patient will be able to ambulate around clinic independently with crutches WBAT, increased sukhdeep and increased stance time on (L) LE  Patient to be independent with home exercise program as demonstrated by performance with correct form without cues.   LTG: (to be met in 16 treatments)  Patient will improve LEFS to <50% functionally impaired in order to indicate improved overall quality of life  Patient will improve (L) knee AROM to 0-120 deg in order to increase ease of negotiating stairs  Patient will improve (L) knee strength to 5/5 in order to increase walking tolerance and assist patient back to running  Patient will improve (B) SLS to 30 sec in order to improve balance and stability within (B) LE  Patient will be able to ambulate around clinic independently without AD, increased sukhdeep, improved (L) knee flexion and extension and improved WB through (L) LE  Patient will report pain as 0/10                    Patient goals: \"Full ROM and return to work\"    Pt. Education:  [x] Yes  [] No  [x] Reviewed Prior HEP/Ed  Method of Education: [x] Verbal  [] Demo  [] Written  Comprehension of Education:  [x] Verbalizes understanding. [x] Demonstrates understanding. [x] Needs review. [] Demonstrates/verbalizes HEP/Ed previously given. Access Code: [email or web address]  URL: [email or web address]  Date: 07/20/2022  Prepared by: Moni Chacon     Exercises  Seated Hamstring Stretch - 2 x daily - 7 x weekly - 3 sets - 30 sec hold  Gastroc Stretch on Wall - 2 x daily - 7 x weekly - 3 sets - 30 sec hold  Seated Heel Slide - 2 x daily - 7 x weekly - 3 sets - 10 reps  Supine Quad Set - 2 x daily - 7 x weekly - 3 sets - 10 reps - 5 sec hold  Active Straight Leg Raise with Quad Set - 2 x daily - 7 x weekly - 3 sets - 10 reps  Sidelying Hip Abduction - 2 x daily - 7 x weekly - 3 sets - 10 reps           Plan: [x] Continue current frequency toward long and short term goals.     [x] Specific Instructions for subsequent treatments: cont per POC      Time In: 9:10 am            Time Out: 10:00 am    Electronically signed by:  Clifton Avendano PTA

## 2022-07-28 ENCOUNTER — HOSPITAL ENCOUNTER (OUTPATIENT)
Dept: PHYSICAL THERAPY | Facility: CLINIC | Age: 27
Setting detail: THERAPIES SERIES
Discharge: HOME | End: 2022-07-28
Payer: MEDICARE

## 2022-07-28 PROCEDURE — 97016 VASOPNEUMATIC DEVICE THERAPY: CPT

## 2022-07-28 PROCEDURE — 97110 THERAPEUTIC EXERCISES: CPT

## 2022-07-28 NOTE — FLOWSHEET NOTE
[] [facility]) [facility] &  Therapy  [address]  [phone]  F: [phone] [] [address]  [place] [address]   [address]  P: [phone]  F: [phone] [x] [address]  [address]  P: [phone]  F: [phone] [] [address]  P: [phone]  F: [phone] [] [address]  [facility]   [address]   [place]: [phone]  F: [phone]      Physical Therapy Daily Treatment Note    Date:  2022  Patient Name:  Jo Gonzalez    :  1995  MRN: [id number]  Physician: Mars Davey MD                                    Insurance: [place] [employer] ( visits approved)   Medical Diagnosis:   Diagnosis   G89.18 (ICD-10-CM) - Acute postoperative pain                             Rehab Codes: M25.562, M25.662, M25.462, R26.89, M62.81, R20.2  Onset date: 22 sx              Next 's appt.: PRN  Visit# / total visits: 3/16    Cancels/No Shows: 0/1    Subjective: pt mentions that after leaving from the clinic she lost her balance slightly and took a step onto the left leg, was a little sore after and some pressure into the knee. Feeling better today but still with some soreness. Pain:  [x] Yes  [] No Location: L knee Pain Rating: (0-10 scale) 5/10  Pain altered Tx:  [x] No  [] Yes  Action:  Comments:    Objective:       Todays Treatment:  Modalities: Vasocompression: L knee medium pressure 15' post ex   Precautions: (L) ACL protocol phase 2, TTWB for 6 weeks (), 0-90 deg knee flexion for 6 weeks  Exercise: L knee  Reps/ Time Weight/ Level Comments   Nustep 8'  L2               SB S 30\"x3      HS S 30\"x3                Seated Heel Slide x20       Seated Hip flexion x20                 Quad set 5\"x20 SLR 2x10       SL hip abd 2x10       Prone quad set 20x5\"     Prone hip ext 20x           TKE 20x5\" blue                    Other:     Specific Instructions for next treatment: progress closed chain ex per protocol, ice and elevate,  E-stim as needed       Treatment Charges: Mins Units   []  Modalities     [x]  Ther Exercise 30 2   []  Manual Therapy     []  Ther Activities     []  Aquatics     [x]  Vasocompression 15 1   []  Other     Total Treatment time 45 3       Assessment: [x] Progressing toward goals. Continued with seated warm up, able to complete stretches in standing. Also completed resisted TKE this date to progress quad strength. Minimal quad lag with SLR. Able to increase reps of prone quad sets and add prone hip extensions. Leg raises completed with brace on. L knee flexion at 90°, full extension. Notes some pull into the scar at top of the knee, states she has been doing scar massage. Ended with vaso to control post ex soreness and edema. Will continue to progress as allowed per protocol. [] No change. [] Other:  [x] Patient would continue to benefit from skilled physical therapy services in order to: restore L knee ROM and strength to allow pt to return to PLOF. STG: (to be met in 8 treatments)  ? Pain: Patient will report pain as 1/10  ? ROM: Patient will improve (L) knee AROM to 0-110 deg in order to improve gait mechanics  Patient will improve (L) ankle AROM to 10-60 deg in order to indicate improved gastroc length and improve gait mechanics   ? Function: Patient will be able to ambulate around clinic independently with crutches WBAT, increased sukhdeep and increased stance time on (L) LE  Patient to be independent with home exercise program as demonstrated by performance with correct form without cues.   LTG: (to be met in 16 treatments)  Patient will improve LEFS to <50% functionally impaired in order to indicate improved overall quality of life  Patient will improve (L) knee AROM

## 2022-08-01 ENCOUNTER — HOSPITAL ENCOUNTER (OUTPATIENT)
Dept: PHYSICAL THERAPY | Facility: CLINIC | Age: 27
Setting detail: THERAPIES SERIES
Discharge: HOME | End: 2022-08-01
Payer: MEDICARE

## 2022-08-01 PROCEDURE — 97110 THERAPEUTIC EXERCISES: CPT

## 2022-08-01 PROCEDURE — 97016 VASOPNEUMATIC DEVICE THERAPY: CPT

## 2022-08-01 NOTE — FLOWSHEET NOTE
[] [facility]) [facility] CENTER &  Therapy  [address]  [phone]  F: [phone] [] [address]  [facility]   [address]  P: [phone]  F: [phone] [x] [address]  [address]  P: [phone]  F: [phone] [] [address]  P: [phone]  F: [phone] [] [address]  [facility]   [address]   [place]: [phone]  F: [phone]      Physical Therapy Daily Treatment Note    Date:  2022  Patient Name:  Yovani Rosario    :  1995  MRN: [id number]  Physician: Roz Mehta MD                                    Insurance: [place] [employer] ( visits approved)   Medical Diagnosis:   Diagnosis   G89.18 (ICD-10-CM) - Acute postoperative pain                             Rehab Codes: M25.562, M25.662, M25.462, R26.89, M62.81, R20.2  Onset date: 22 sx              Next 's appt.: PRN  Visit# / total visits:     Cancels/No Shows: 0/1    Subjective: presents with a little soreness but otherwise pt states she is doing ok. Does feel the knee is a little more swollen today. Pain:  [x] Yes  [] No Location: L knee Pain Rating: (0-10 scale) 4/10  Pain altered Tx:  [x] No  [] Yes  Action:  Comments:    Objective:       Todays Treatment:  Modalities: Vasocompression: L knee medium pressure 15' post ex   Precautions: (L) ACL protocol phase 2, TTWB for 6 weeks (), 0-90 deg knee flexion for 6 weeks  Exercise: L knee  Reps/ Time Weight/ Level Comments   Nustep 8'  L2               SB S 30\"x3      HS S 30\"x3                Supine Heel Slide x30       Quad set 5\"x30       SLR 3x10       SL hip abd 3x10       Prone quad set 30x5\"     Prone hip ext 20x           TKE 30x5\" [place] Other:     Specific Instructions for next treatment: progress per protocol      Treatment Charges: Mins Units   []  Modalities     [x]  Ther Exercise 30 2   []  Manual Therapy     []  Ther Activities     []  Aquatics     [x]  Vasocompression 15 1   []  Other     Total Treatment time 45 3       Assessment: [x] Progressing toward goals. Continued with stretches and strengthening ex as noted per chart. Cues while completing SLR to complete full movement, quad set before each lift. No pain with completion of ex just mild soreness. Denied any tenderness around L knee. Increased reps to 30 for all mat ex. Ended with vaso to address swelling and soreness. Advised pt to continue to ice as need with leg elevated to decrease any swelling present. [] No change. [] Other:  [x] Patient would continue to benefit from skilled physical therapy services in order to: restore L knee ROM and strength to allow pt to return to PLOF. STG: (to be met in 8 treatments)  ? Pain: Patient will report pain as 1/10  ? ROM: Patient will improve (L) knee AROM to 0-110 deg in order to improve gait mechanics  Patient will improve (L) ankle AROM to 10-60 deg in order to indicate improved gastroc length and improve gait mechanics   ? Function: Patient will be able to ambulate around clinic independently with crutches WBAT, increased sukhdeep and increased stance time on (L) LE  Patient to be independent with home exercise program as demonstrated by performance with correct form without cues.   LTG: (to be met in 16 treatments)  Patient will improve LEFS to <50% functionally impaired in order to indicate improved overall quality of life  Patient will improve (L) knee AROM to 0-120 deg in order to increase ease of negotiating stairs  Patient will improve (L) knee strength to 5/5 in order to increase walking tolerance and assist patient back to running  Patient will improve (B) SLS to 30 sec in order to improve balance and stability within

## 2022-08-04 ENCOUNTER — HOSPITAL ENCOUNTER (OUTPATIENT)
Dept: PHYSICAL THERAPY | Facility: CLINIC | Age: 27
Setting detail: THERAPIES SERIES
Discharge: HOME | End: 2022-08-04
Payer: MEDICARE

## 2022-08-04 PROCEDURE — 97016 VASOPNEUMATIC DEVICE THERAPY: CPT

## 2022-08-04 PROCEDURE — 97110 THERAPEUTIC EXERCISES: CPT

## 2022-08-04 NOTE — FLOWSHEET NOTE
[] [address]  [address]  [phone]  F: [phone] [] [address]  [facility] 36   [address]  P: [phone]  F: [phone] [x] [place] [address]  Therapy  [address]  P: [phone]  F: [phone] [] [address]  P: [phone]  F: [phone] [] [address]  [facility]   [address]   [place]: [phone]  F: [phone]      Physical Therapy Daily Treatment Note    Date:  2022  Patient Name:  Isrrael Donald    :  1995  MRN: [id number]  Physician: Araseli Jon MD                                    Insurance: [place] [employer] ( visits approved)   Medical Diagnosis:   Diagnosis   G89.18 (ICD-10-CM) - Acute postoperative pain                             Rehab Codes: M25.562, M25.662, M25.462, R26.89, M62.81, R20.2  Onset date: 22 sx              Next 's appt.: 8/10/22  Visit# / total visits:     Cancels/No Shows: 0/1    Subjective:    Pain:  [x] Yes  [] No Location: L knee Pain Rating: (0-10 scale) 4/10  Pain altered Tx:  [x] No  [] Yes  Action:  Comments: Pt mentioned that she slipped last week and her knee has been sore since. Objective:       Todays Treatment:  Modalities: Vasocompression: L knee medium pressure 15' post ex   Precautions: (L) ACL protocol phase 2, TTWB for 6 weeks (), 0-90 deg knee flexion for 6 weeks  Exercise: L knee  Reps/ Time Weight/ Level Comments    Kellen 10'     x              SB S 30\"x3    x   HS S 30\"x3    x          Banded bridges 20x3\" Lime  Blueberry next x   Clam shells 20x Lime   x   Side lying abd 20x Lime   x   Prone quad set 10\"x10   x   SB bridges  20x Purple SB  x   Plank 30\"x3   x          // bars:       Calf raises 20x x   3-way hip 20x     x             TG:squats/HR 20x L11   x                        TKE 30x5\" Plum                        Other:     Specific Instructions for next treatment: progress per protocol      Treatment Charges: Mins Units   []  Modalities     [x]  Ther Exercise 45 3   []  Manual Therapy     []  Ther Activities     []  Aquatics     [x]  Vasocompression 15 1   []  Other     Total Treatment time 60 4       Assessment: [x] Progressing toward goals. Able to begin warm up with Airdyne today followed by standing stretches. Pt able to begin WBAT today but very nervious to do. Utilized // bars for pt mental comfort and UE support with all standing exercises. Added resistance bands to table exercises along with planks and SB bridges for core. Finished with vaso. [] No change. [] Other:  [x] Patient would continue to benefit from skilled physical therapy services in order to: restore L knee ROM and strength to allow pt to return to PLOF. STG: (to be met in 8 treatments)  ? Pain: Patient will report pain as 1/10  ? ROM: Patient will improve (L) knee AROM to 0-110 deg in order to improve gait mechanics  Patient will improve (L) ankle AROM to 10-60 deg in order to indicate improved gastroc length and improve gait mechanics   ? Function: Patient will be able to ambulate around clinic independently with crutches WBAT, increased sukhdeep and increased stance time on (L) LE  Patient to be independent with home exercise program as demonstrated by performance with correct form without cues.   LTG: (to be met in 16 treatments)  Patient will improve LEFS to <50% functionally impaired in order to indicate improved overall quality of life  Patient will improve (L) knee AROM to 0-120 deg in order to increase ease of negotiating stairs  Patient will improve (L) knee strength to 5/5 in order to increase walking tolerance and assist patient back to running  Patient will improve (B) SLS to 30 sec in order to improve balance and stability within (B) LE  Patient will be able to ambulate around clinic independently without AD, increased sukhdeep, improved (L) knee flexion and extension and improved WB through (L) LE  Patient will report pain as 0/10                    Patient goals: \"Full ROM and return to work\"    Pt. Education:  [x] Yes  [] No  [x] Reviewed Prior HEP/Ed  Method of Education: [x] Verbal  [] Demo  [] Written  Comprehension of Education:  [x] Verbalizes understanding. [x] Demonstrates understanding. [x] Needs review. [] Demonstrates/verbalizes HEP/Ed previously given. Access Code: [email or web address]  URL: [email or web address]  Date: 07/20/2022  Prepared by: Martin Verde     Exercises  Seated Hamstring Stretch - 2 x daily - 7 x weekly - 3 sets - 30 sec hold  Gastroc Stretch on Wall - 2 x daily - 7 x weekly - 3 sets - 30 sec hold  Seated Heel Slide - 2 x daily - 7 x weekly - 3 sets - 10 reps  Supine Quad Set - 2 x daily - 7 x weekly - 3 sets - 10 reps - 5 sec hold  Active Straight Leg Raise with Quad Set - 2 x daily - 7 x weekly - 3 sets - 10 reps  Sidelying Hip Abduction - 2 x daily - 7 x weekly - 3 sets - 10 reps           Plan: [x] Continue current frequency toward long and short term goals.     [x] Specific Instructions for subsequent treatments: cont per POC      Time In: 11:03 am            Time Out: 12:06 pm    Electronically signed by:  Vincenzo Elias PTA

## 2022-08-08 ENCOUNTER — HOSPITAL ENCOUNTER (OUTPATIENT)
Dept: PHYSICAL THERAPY | Facility: CLINIC | Age: 27
Setting detail: THERAPIES SERIES
Discharge: HOME | End: 2022-08-08
Payer: MEDICARE

## 2022-08-08 PROCEDURE — 97110 THERAPEUTIC EXERCISES: CPT

## 2022-08-08 PROCEDURE — 97016 VASOPNEUMATIC DEVICE THERAPY: CPT

## 2022-08-08 NOTE — FLOWSHEET NOTE
[] [address]  [address]  [phone]  F: [phone] [] [address]  [facility] 36   [address]  P: [phone]  F: [phone] [x] [place]  Therapy  [address]  P: [phone]  F: [phone] [] [address]  P: [phone]  F: [phone] [] [address]  [facility]   [address]   [place]: [phone]  F: [phone]      Physical Therapy Daily Treatment Note    Date:  2022  Patient Name:  Rhea Triplett    :  1995  MRN: [id number]  Physician: Gianfranco Meyers MD                                    Insurance: [place] [employer] ( visits approved)   Medical Diagnosis:   Diagnosis   G89.18 (ICD-10-CM) - Acute postoperative pain                             Rehab Codes: M25.562, M25.662, M25.462, R26.89, M62.81, R20.2  Onset date: 22 sx              Next Dr's appt.: 8/10/22  Visit# / total visits:     Cancels/No Shows: 0/1    Subjective:    Pain:  [x] Yes  [] No Location: L knee Pain Rating: (0-10 scale) 1-2/10  Pain altered Tx:  [x] No  [] Yes  Action:  Comments: Pt stated that she is feeling more confident today. Objective:       Todays Treatment:  Modalities: Vasocompression: L knee medium pressure 15' post ex   Precautions: (L) ACL protocol phase 2, TTWB for 6 weeks (), 0-90 deg knee flexion for 6 weeks  Exercise: L knee  Reps/ Time Weight/ Level Comments    Airdyne 10'     x              SB S 30\"x3    x   HS S 30\"x3    x          Banded bridges 20x3\" Blueberry    x   Clam shells 20x Blueberry   x   Side lying abd 20x Blueberry   x   SB bridges  20x Purple SB     Plank 30\"x3             Step ups 20x 4\" // bars x   Heel tap 20x 4\"  x   Calf raises 20x   x   3-way hip 20x Lime    x   TG:squats/HR 20x L15   x   TKE 30x5\" Plum                        Other:     Specific Instructions for next treatment: progress per protocol      Treatment Charges: Mins Units   []  Modalities     [x]  Ther Exercise 45 3   []  Manual Therapy     []  Ther Activities     []  Aquatics     [x]  Vasocompression 15 1   []  Other     Total Treatment time 60 4       Assessment: [x] Progressing toward goals. Pt able to complete full revolutions with spin bike and followed with standing stretches. Pt instructed on keeping brace on with standing exercises. Progressed program with step ups, heel taps, and resistance with hip exercises. Pt required mod/max verbal cueing for technique, posturing, and mechanics. Finished with hip/core stability table exercises with vaso at the end.     [] No change. [] Other:  [x] Patient would continue to benefit from skilled physical therapy services in order to: restore L knee ROM and strength to allow pt to return to PLOF. STG: (to be met in 8 treatments)  ? Pain: Patient will report pain as 1/10  ? ROM: Patient will improve (L) knee AROM to 0-110 deg in order to improve gait mechanics  Patient will improve (L) ankle AROM to 10-60 deg in order to indicate improved gastroc length and improve gait mechanics   ? Function: Patient will be able to ambulate around clinic independently with crutches WBAT, increased sukhdeep and increased stance time on (L) LE  Patient to be independent with home exercise program as demonstrated by performance with correct form without cues.   LTG: (to be met in 16 treatments)  Patient will improve LEFS to <50% functionally impaired in order to indicate improved overall quality of life  Patient will improve (L) knee AROM to 0-120 deg in order to increase ease of negotiating stairs  Patient will improve (L) knee strength to 5/5 in order to increase walking tolerance and assist patient back to running  Patient will improve (B) SLS to 30 sec in order to improve balance and stability within (B) LE  Patient will be able to ambulate around clinic independently without AD, increased sukhdeep, improved (L) knee flexion and extension and improved WB through (L) LE  Patient will report pain as 0/10                    Patient goals: \"Full ROM and return to work\"    Pt. Education:  [x] Yes  [] No  [x] Reviewed Prior HEP/Ed  Method of Education: [x] Verbal  [] Demo  [] Written  Comprehension of Education:  [x] Verbalizes understanding. [x] Demonstrates understanding. [x] Needs review. [] Demonstrates/verbalizes HEP/Ed previously given. Access Code: [email or web address]  URL: [email or web address]  Date: 07/20/2022  Prepared by: Anisa Starks     Exercises  Seated Hamstring Stretch - 2 x daily - 7 x weekly - 3 sets - 30 sec hold  Gastroc Stretch on Wall - 2 x daily - 7 x weekly - 3 sets - 30 sec hold  Seated Heel Slide - 2 x daily - 7 x weekly - 3 sets - 10 reps  Supine Quad Set - 2 x daily - 7 x weekly - 3 sets - 10 reps - 5 sec hold  Active Straight Leg Raise with Quad Set - 2 x daily - 7 x weekly - 3 sets - 10 reps  Sidelying Hip Abduction - 2 x daily - 7 x weekly - 3 sets - 10 reps           Plan: [x] Continue current frequency toward long and short term goals.     [x] Specific Instructions for subsequent treatments: cont per POC      Time In: 11:03 am            Time Out: 12:014 pm    Electronically signed by:  Kalyan Johnson PTA

## 2022-08-11 ENCOUNTER — HOSPITAL ENCOUNTER (OUTPATIENT)
Dept: PHYSICAL THERAPY | Facility: CLINIC | Age: 27
Setting detail: THERAPIES SERIES
Discharge: HOME | End: 2022-08-11
Payer: MEDICARE

## 2022-08-11 NOTE — FLOWSHEET NOTE
[] [facility] [address]  [address]    [phone]  F: [phone]   [] [address]  [facility] 36   [address]  P: [phone]  F: [phone]  [x] [address] &  Therapy  [address]  P: [phone]  F: [phone] [] [address]  P: [phone]  F: [phone]  [] [address]  [address] [address] [facility] 70   [address]   [place]: [phone]  F: [phone]   [] [address]  [place]: [phone]   F: [phone]     Physical Therapy Cancel/No Show note    Date: 2022  Patient: Janneth Gates  : 1995  MRN: [id number]    Cancels/No Shows to date:     For today's appointment patient:    [x]  Cancelled    [] Rescheduled appointment    [] No-show     Reason given by patient:    []  Patient ill    []  Conflicting appointment    [] No transportation      [] Conflict with work    [x] No reason given    [] Weather related    [] [id number]    [] Other:      Comments:        [x] Next appointment was confirmed    Electronically signed by: Ector Prabhakar PTA

## 2022-08-15 ENCOUNTER — HOSPITAL ENCOUNTER (OUTPATIENT)
Dept: PHYSICAL THERAPY | Facility: CLINIC | Age: 27
Setting detail: THERAPIES SERIES
Discharge: HOME | End: 2022-08-15
Payer: MEDICARE

## 2022-08-15 NOTE — FLOWSHEET NOTE
[] [facility] &  Therapy  [address]    [phone]  F: [phone]   [] [address]  [facility] 36   [address]  P: [phone]  F: [phone]  [] [address] &  Therapy  [address]  P: [phone]  F: [phone] [] [address]  P: [phone]  F: [phone]  [] [address]  [facility]   [address]   [place]: [phone]  F: [phone]   [] [address]  [place]: [phone]   F: [phone]     Physical Therapy Cancel/No Show note    Date: 8/15/2022  Patient: Isrrael Donald  : 1995  MRN: [id number]    Cancels/No Shows to date:     For today's appointment patient:    [x]  Cancelled    [] Rescheduled appointment    [] No-show     Reason given by patient:    []  Patient ill    []  Conflicting appointment    [] No transportation      [] Conflict with work    [] No reason given    [] Weather related    [] COVID-19    [x] Other:      Comments:  knee pain      [] Next appointment was confirmed    Electronically signed by: Manuel De Luna

## 2022-08-18 ENCOUNTER — HOSPITAL ENCOUNTER (OUTPATIENT)
Dept: PHYSICAL THERAPY | Facility: CLINIC | Age: 27
Setting detail: THERAPIES SERIES
Discharge: HOME | End: 2022-08-18
Payer: MEDICARE

## 2022-08-18 PROCEDURE — 97110 THERAPEUTIC EXERCISES: CPT

## 2022-08-18 PROCEDURE — 97016 VASOPNEUMATIC DEVICE THERAPY: CPT

## 2022-08-18 NOTE — FLOWSHEET NOTE
[] [address]  [address]  [phone]  F: [phone] [] [address]  [facility] 36   [address]  P: [phone]  F: [phone] [x] [place] [address]  Therapy  [address]  P: [phone]  F: [phone] [] [address]  P: [phone]  F: [phone] [] [address]  [facility]   [address]   [place]: [phone]  F: [phone]      Physical Therapy Daily Treatment Note    Date:  2022  Patient Name:  Jo Gonzalez    :  1995  MRN: [id number]  Physician: Mars Davey MD                                    Insurance: [place] [employer] ( visits approved)   Medical Diagnosis:   Diagnosis   G89.18 (ICD-10-CM) - Acute postoperative pain                             Rehab Codes: M25.562, M25.662, M25.462, R26.89, M62.81, R20.2  Onset date: 22 sx              Next Dr's appt.: 10/10/22  Visit# / total visits:     Cancels/No Shows:     Subjective:    Pain:  [x] Yes  [] No Location: L knee Pain Rating: (0-10 scale) 1-2/10  Pain altered Tx:  [x] No  [] Yes  Action:  Comments: Pt stated that neil was pleased with her progress but she had increased knee pain last week so she decided not to come. Objective:       Todays Treatment:  Modalities: Vasocompression: L knee medium pressure 15' post ex   Precautions: (L) ACL protocol phase 2, TTWB for 6 weeks (), 0-90 deg knee flexion for 6 weeks  Exercise: L knee  Reps/ Time Weight/ Level Comments    Kellen 10'     x              SB S 30\"x3    x   HS S 30\"x3    x          Banded bridges 20x3\" Plum     x   Clam shells 20x Plum   x   Side lying abd 20x Plum    x   SB bridges  20x Blue SB  x   Plank 30\"x3             Step ups 20x 6\" // bars x Eccentric step down 20x 6\" With UE support x   Calf raises 20x   x   3-way hip 20x Lime    x   TG:squats/HR 30x L15   x   TKE 30x5\" Plum                        Other:     Specific Instructions for next treatment: progress per protocol      Treatment Charges: Mins Units   []  Modalities     [x]  Ther Exercise 45 3   []  Manual Therapy     []  Ther Activities     []  Aquatics     [x]  Vasocompression 15 1   []  Other     Total Treatment time 60 4       Assessment: [x] Progressing toward goals. Pt able to complete all exercises with improved form and confidence today. Able to increase step height for step ups and eccentrics. Pt will need improvement in SLS on affected side to improve strength and stability . Finished with hip/core exercises on table followed with vaso. [] No change. [] Other:  [x] Patient would continue to benefit from skilled physical therapy services in order to: restore L knee ROM and strength to allow pt to return to PLOF. STG: (to be met in 8 treatments)  ? Pain: Patient will report pain as 1/10  ? ROM: Patient will improve (L) knee AROM to 0-110 deg in order to improve gait mechanics  Patient will improve (L) ankle AROM to 10-60 deg in order to indicate improved gastroc length and improve gait mechanics   ? Function: Patient will be able to ambulate around clinic independently with crutches WBAT, increased sukhdeep and increased stance time on (L) LE  Patient to be independent with home exercise program as demonstrated by performance with correct form without cues.   LTG: (to be met in 16 treatments)  Patient will improve LEFS to <50% functionally impaired in order to indicate improved overall quality of life  Patient will improve (L) knee AROM to 0-120 deg in order to increase ease of negotiating stairs  Patient will improve (L) knee strength to 5/5 in order to increase walking tolerance and assist patient back to running  Patient will improve (B) SLS to 30 sec in order to improve

## 2022-08-22 ENCOUNTER — HOSPITAL ENCOUNTER (OUTPATIENT)
Dept: PHYSICAL THERAPY | Facility: CLINIC | Age: 27
Setting detail: THERAPIES SERIES
Discharge: HOME | End: 2022-08-22
Payer: MEDICARE

## 2022-08-22 PROCEDURE — 97110 THERAPEUTIC EXERCISES: CPT

## 2022-08-22 PROCEDURE — 97016 VASOPNEUMATIC DEVICE THERAPY: CPT

## 2022-08-22 NOTE — FLOWSHEET NOTE
[] [address]  [address]  [phone]  F: [phone] [] [address]  [facility]   [address]  P: [phone]  F: [phone] [x] [place]  Therapy  [address]  P: [phone]  F: [phone] [] [address]  P: [phone]  F: [phone] [] [address]  [facility]   [address]   [place]: [phone]  F: [phone]      Physical Therapy Daily Treatment Note    Date:  2022  Patient Name:  Alireza Fam    :  1995  MRN: [id number]  Physician: Bibi Salcedo MD                                    Insurance: [place] [employer] ( visits approved)   Medical Diagnosis:   Diagnosis   G89.18 (ICD-10-CM) - Acute postoperative pain                             Rehab Codes: M25.562, M25.662, M25.462, R26.89, M62.81, R20.2  Onset date: 22 sx              Next Dr's appt.: 8/10/22  Visit# / total visits:     Cancels/No Shows:     Subjective:    Pain:  [x] Yes  [] No Location: L knee Pain Rating: (0-10 scale) 1-2/10  Pain altered Tx:  [x] No  [] Yes  Action:  Comments: Pt mentioned still having a clicking in her knee. Objective:       Todays Treatment:  Modalities: Vasocompression: L knee medium pressure 15' post ex   Precautions: (L) ACL protocol phase 2, TTWB for 6 weeks (), 0-90 deg knee flexion for 6 weeks  Exercise: L knee  Reps/ Time Weight/ Level Comments    Airmaria c 10'                    SB S 30\"x3       HS S 30\"x3              Banded bridges 20x3\" Blueberry       Clam shells 20x Blueberry      Side lying abd 20x Blueberry      SB bridges  20x Purple SB     Plank 30\"x3             Step ups 20x 6\"     Heel tap 20x 4\"     Calf raises 20x      3-way hip 20x Lime TG:squats/HR 20x L17      TKE 20x5\" Plum                        Other:     Specific Instructions for next treatment: progress per protocol      Treatment Charges: Mins Units   []  Modalities     [x]  Ther Exercise 45 3   []  Manual Therapy     []  Ther Activities     []  Aquatics     [x]  Vasocompression 15 1   []  Other     Total Treatment time 60 4       Assessment: [x] Progressing toward goals. Pt was able to complete step up and taps with out UE support. Pt did perform better with heel taps utilizing a mirror for visual cueing. UE support was needed with SLS band exercises. Increased height of TG today to challenge pt strengthening program. Educated pt on proper gait form to help decrease pt limp d/t landing mid stance. Finished with table exercises and vaso. [] No change. [] Other:  [x] Patient would continue to benefit from skilled physical therapy services in order to: restore L knee ROM and strength to allow pt to return to PLOF. STG: (to be met in 8 treatments)  ? Pain: Patient will report pain as 1/10  ? ROM: Patient will improve (L) knee AROM to 0-110 deg in order to improve gait mechanics  Patient will improve (L) ankle AROM to 10-60 deg in order to indicate improved gastroc length and improve gait mechanics   ? Function: Patient will be able to ambulate around clinic independently with crutches WBAT, increased sukhdeep and increased stance time on (L) LE  Patient to be independent with home exercise program as demonstrated by performance with correct form without cues.   LTG: (to be met in 16 treatments)  Patient will improve LEFS to <50% functionally impaired in order to indicate improved overall quality of life  Patient will improve (L) knee AROM to 0-120 deg in order to increase ease of negotiating stairs  Patient will improve (L) knee strength to 5/5 in order to increase walking tolerance and assist patient back to running  Patient will improve (B) SLS to 30 sec in order to improve balance and stability within (B) LE  Patient will be able to ambulate around clinic independently without AD, increased sukhdeep, improved (L) knee flexion and extension and improved WB through (L) LE  Patient will report pain as 0/10                    Patient goals: \"Full ROM and return to work\"    Pt. Education:  [x] Yes  [] No  [x] Reviewed Prior HEP/Ed  Method of Education: [x] Verbal  [] Demo  [] Written  Comprehension of Education:  [x] Verbalizes understanding. [x] Demonstrates understanding. [x] Needs review. [] Demonstrates/verbalizes HEP/Ed previously given. Access Code: [email or web address]  URL: [email or web address]  Date: 07/20/2022  Prepared by: Caroline Loera     Exercises  Seated Hamstring Stretch - 2 x daily - 7 x weekly - 3 sets - 30 sec hold  Gastroc Stretch on Wall - 2 x daily - 7 x weekly - 3 sets - 30 sec hold  Seated Heel Slide - 2 x daily - 7 x weekly - 3 sets - 10 reps  Supine Quad Set - 2 x daily - 7 x weekly - 3 sets - 10 reps - 5 sec hold  Active Straight Leg Raise with Quad Set - 2 x daily - 7 x weekly - 3 sets - 10 reps  Sidelying Hip Abduction - 2 x daily - 7 x weekly - 3 sets - 10 reps           Plan: [x] Continue current frequency toward long and short term goals.     [x] Specific Instructions for subsequent treatments: cont per POC      Time In: 11:03 am            Time Out: 12:14 pm    Electronically signed by:  Jabari Nguyen PTA

## 2022-08-25 ENCOUNTER — HOSPITAL ENCOUNTER (OUTPATIENT)
Dept: PHYSICAL THERAPY | Facility: CLINIC | Age: 27
Setting detail: THERAPIES SERIES
Discharge: HOME | End: 2022-08-25
Payer: MEDICARE

## 2022-08-25 PROCEDURE — 97110 THERAPEUTIC EXERCISES: CPT

## 2022-08-25 PROCEDURE — 97016 VASOPNEUMATIC DEVICE THERAPY: CPT

## 2022-08-25 NOTE — FLOWSHEET NOTE
[] [address]  [address]  [phone]  F: [phone] [] [address]  [facility] 36   [address]  P: [phone]  F: [phone] [x] [place]  Therapy  [address]  P: [phone]  F: [phone] [] [address]  P: [phone]  F: [phone] [] [address]  [facility]   [address]   [place]: [phone]  F: [phone]      Physical Therapy Daily Treatment Note    Date:  2022  Patient Name:  Ezekiel Gonzales    :  1995  MRN: [id number]  Physician: Roshni Castro MD                                    Insurance: [place] [employer] ( visits approved)   Medical Diagnosis:   Diagnosis   G89.18 (ICD-10-CM) - Acute postoperative pain                             Rehab Codes: M25.562, M25.662, M25.462, R26.89, M62.81, R20.2  Onset date: 22 sx              Next Dr's appt.: 8/10/22  Visit# / total visits:     Cancels/No Shows:     Subjective:    Pain:  [x] Yes  [] No Location: L knee Pain Rating: (0-10 scale) 1-2/10  Pain altered Tx:  [x] No  [] Yes  Action:  Comments: Pt stated that doctor wishes for her to get a MRI to check status of her knee d/t to excessive clicking. Objective:       Todays Treatment:  Modalities: Vasocompression: L knee medium pressure 15' post ex   Precautions: (L) ACL protocol phase 2, TTWB for 6 weeks (), 0-90 deg knee flexion for 6 weeks  Exercise: L knee  Reps/ Time Weight/ Level Comments    Kellen 10'     x              SB S 30\"x3    x   HS S 30\"x3    x          Banded bridges 20x3\" Plum    x   Clam shells 20x Plum   x   Side lying abd 20x Plum   x   SB HS curl  10x Purple SB  x   Plank 35\"x3   x          Step ups 20x 6\"  x   Heel tap 20x 4\"  x stability within (B) LE  Patient will be able to ambulate around clinic independently without AD, increased sukhdeep, improved (L) knee flexion and extension and improved WB through (L) LE  Patient will report pain as 0/10                    Patient goals: \"Full ROM and return to work\"    Pt. Education:  [x] Yes  [] No  [x] Reviewed Prior HEP/Ed  Method of Education: [x] Verbal  [] Demo  [] Written  Comprehension of Education:  [x] Verbalizes understanding. [x] Demonstrates understanding. [x] Needs review. [] Demonstrates/verbalizes HEP/Ed previously given. Access Code: [email or web address]  URL: [email or web address]  Date: 07/20/2022  Prepared by: Aramis Mcdermott     Exercises  Seated Hamstring Stretch - 2 x daily - 7 x weekly - 3 sets - 30 sec hold  Gastroc Stretch on Wall - 2 x daily - 7 x weekly - 3 sets - 30 sec hold  Seated Heel Slide - 2 x daily - 7 x weekly - 3 sets - 10 reps  Supine Quad Set - 2 x daily - 7 x weekly - 3 sets - 10 reps - 5 sec hold  Active Straight Leg Raise with Quad Set - 2 x daily - 7 x weekly - 3 sets - 10 reps  Sidelying Hip Abduction - 2 x daily - 7 x weekly - 3 sets - 10 reps           Plan: [x] Continue current frequency toward long and short term goals.     [x] Specific Instructions for subsequent treatments: cont per POC      Time In: 11:12 am            Time Out: 12:21 pm    Electronically signed by:  Abi Sumner PTA

## 2022-08-29 ENCOUNTER — HOSPITAL ENCOUNTER (OUTPATIENT)
Dept: PHYSICAL THERAPY | Facility: CLINIC | Age: 27
Setting detail: THERAPIES SERIES
Discharge: HOME | End: 2022-08-29
Payer: MEDICARE

## 2022-09-01 ENCOUNTER — APPOINTMENT (OUTPATIENT)
Dept: PHYSICAL THERAPY | Facility: CLINIC | Age: 27
End: 2022-09-01
Payer: MEDICARE

## 2022-09-06 ENCOUNTER — HOSPITAL ENCOUNTER (OUTPATIENT)
Dept: PHYSICAL THERAPY | Facility: CLINIC | Age: 27
Setting detail: THERAPIES SERIES
Discharge: HOME | End: 2022-09-06
Payer: MEDICARE

## 2022-09-06 PROCEDURE — 97530 THERAPEUTIC ACTIVITIES: CPT

## 2022-09-06 PROCEDURE — 97016 VASOPNEUMATIC DEVICE THERAPY: CPT

## 2022-09-06 PROCEDURE — 97110 THERAPEUTIC EXERCISES: CPT

## 2022-09-06 NOTE — PROGRESS NOTES
[] [address]  [address]  [phone]  F: [phone] [] [address]  [place] 36   [address]  P: [phone]  F: [phone] [x] [place]  Therapy  [address]  P: [phone]  F: [phone] [] [address]  P: [phone]  F: [phone] [] [address]  [facility]   [address]   [place]: [phone]  F: [phone]      Physical Therapy Daily Treatment Note    Date:  2022  Patient Name:  Tad Lennon    :  1995  MRN: [id number]  Physician: Davon Tillman MD                                    Insurance: [place] [employer]age ( visits approved)   Medical Diagnosis:   Diagnosis   G89.18 (ICD-10-CM) - Acute postoperative pain                             Rehab Codes: M25.562, M25.662, M25.462, R26.89, M62.81, R20.2  Onset date: 22 sx              Next Dr's appt.: 8/10/22  Visit# / total visits:     Cancels/No Shows:     Subjective:    Pain:  [x] Yes  [] No Location: L knee Pain Rating: (0-10 scale) 0/10  Pain altered Tx:  [x] No  [] Yes  Action:  Comments: Pt arrives 7 min tardy this date. States that she she is feeling pretty good, however continues to have increased swelling within the (L) knee. States that she has been intermittently wearing her knee brace and states that it is easier to walk with her knee brace on than when its off. States that she received her MRI with no findings noted.      Objective:   ROM  ° A/P STRENGTH     Left Right Left Right   Hip Flex     5 5   Ext           ER           IR           ABD           ADD           Knee Flex 110 120 2 5   Ext -4 0 2 5   Ankle DF 10 10 5 5   PF 60 60 5 5   INV           EVER                            Todays Treatment:  Modalities: Vasocompression: L knee medium pressure 15' post ex   Precautions: (L) ACL protocol phase 2, TTWB for 6 weeks (Aug4th), 0-90 deg knee flexion for 6 weeks  Exercise: L knee  Reps/ Time Weight/ Level Comments    Airdyne 10'     x              SB S 30\"x3    x   HS S 30\"x3    x          Banded bridges 20x3\" Plum    x   Clam shells 20x Plum   x   Side lying abd 20x Plum   x   SB HS curl  10x Purple SB  x   Plank 35\"x3   x          Step ups 20x 6\" Resume next treatment -   Heel tap 20x 4\" Resume next treatment  -   Calf raises 20x      3-way hip 20x Lime   Resume next treatment  -   Box squats 20x 18\"   x   Box hovers 20x3\" 18\"  x   TKE 20x5\" Plum      BOSU lunges 20x  Resume next treatment  -              Other:     Specific Instructions for next treatment: progress per protocol      Treatment Charges: Mins Units   []  Modalities     [x]  Ther Exercise 29 1   []  Manual Therapy     [x]  Ther Activities 10 1   []  Aquatics     [x]  Vasocompression 10 1   []  Other     Total Treatment time 49 3       Assessment: [x] Progressing toward goals. Began session on Airdyne followed with stretches in order to promote mobility within (L) knee. Completed reassessment this with excellent progress demonstrated by patient. Patient met 4/5 short term goals at this time. Patient demonstrates improvements in pain tolerance, (L) knee AROM, (L) ankle AROM and gait mechanics. Patient would continue to benefit from skilled physical therapy in order to continue to improve (L) knee AROM, gait mechanics, (L) knee strength and balance. Patient to continue with current poc of (L) knee mobility and strengthening at this time. No progressions made this date due to reassessment. VC required with planks in order to improve glute activation. VC required with box squats in order to reduce medial knee collapse with good follow through. Ended session with vaso in order to reduce (L) knee swelling and stiffness.  Patient tolerated understanding. [x] Needs review. [] Demonstrates/verbalizes HEP/Ed previously given. Access Code: [email or web address]  URL: [email or web address]  Date: 07/20/2022  Prepared by: Erik Nash     Exercises  Seated Hamstring Stretch - 2 x daily - 7 x weekly - 3 sets - 30 sec hold  Gastroc Stretch on Wall - 2 x daily - 7 x weekly - 3 sets - 30 sec hold  Seated Heel Slide - 2 x daily - 7 x weekly - 3 sets - 10 reps  Supine Quad Set - 2 x daily - 7 x weekly - 3 sets - 10 reps - 5 sec hold  Active Straight Leg Raise with Quad Set - 2 x daily - 7 x weekly - 3 sets - 10 reps  Sidelying Hip Abduction - 2 x daily - 7 x weekly - 3 sets - 10 reps       Treatment Plan:  [x] Therapeutic Exercise   87728  [] Iontophoresis: 4 mg/mL Dexamethasone Sodium Phosphate  mAmin  54308   [x] Therapeutic Activity  44115 [x] Vasopneumatic cold with compression  96910    [] Gait Training   46904 [] Ultrasound   [id number]   [] Neuromuscular Re-education  88057 [] Electrical Stimulation Unattended  47525   [x] Manual Therapy  01472 [] Electrical Stimulation Attended  82519   [x] Instruction in HEP  [] Lumbar/Cervical Traction  50921   [] Aquatic Therapy   48768 [x] Cold/hotpack    [] Massage   84793      [] Dry Needling, 1 or 2 muscles  22625   [] Biofeedback, first 15 minutes   56490  [] Biofeedback, additional 15 minutes   34538 [] Dry Needling, 3 or more muscles  82318       Patient Status:     [x] Continue per initial plan of care. [] Additional visits necessary. [] Other:           Electronically signed by Elvis Jurado PT on 9/6/2022 at 11:15 AM      If you have any questions or concerns, please don't hesitate to call. Thank you for your referral.    Physician Signature:________________________________Date:__________________  By signing above or cosigning this note, I have reviewed this plan of care and certify a need for medically necessary rehabilitation services.      *PLEASE SIGN ABOVE AND FAX BACK ALL PAGES*      Time In: 11:07 am            Time Out: 12:00 pm    Electronically signed by:  Elvis Jurado, PT

## 2022-09-08 ENCOUNTER — HOSPITAL ENCOUNTER (OUTPATIENT)
Dept: PHYSICAL THERAPY | Facility: CLINIC | Age: 27
Setting detail: THERAPIES SERIES
Discharge: HOME | End: 2022-09-08
Payer: MEDICARE

## 2022-09-08 PROCEDURE — 97016 VASOPNEUMATIC DEVICE THERAPY: CPT

## 2022-09-08 PROCEDURE — 97110 THERAPEUTIC EXERCISES: CPT

## 2022-09-08 NOTE — FLOWSHEET NOTE
[x] [facility]  Outpatient Rehabilitation &  Therapy  [address]  P: [phone]  F: [phone]     Physical Therapy Daily Treatment Note    Date:  2022  Patient Name:  Marianne Mane    :  1995  MRN: [id number]  Physician: Shane Stewart MD                                    Insurance: [employer] ( visits approved)   Medical Diagnosis:   Diagnosis   G89.18 (ICD-10-CM) - Acute postoperative pain                             Rehab Codes: M25.562, M25.662, M25.462, R26.89, M62.81, R20.2  Onset date: 22 sx              Next Dr's appt.: 22  Visit# / total visits:     Cancels/No Shows:     Subjective:    Pain:  [x] Yes  [] No Location: L knee Pain Rating: (0-10 scale) 0/10  Pain altered Tx:  [x] No  [] Yes  Action:  Comments: Pt arrived 5 min late to appointment with no c/o or issues. Objective: Todays Treatment:  Modalities: Vasocompression: L knee medium pressure 15' post ex      Exercise: L knee  Reps/ Time Weight/ Level Comments    Airdyne 10'     x              SB S 30\"x3    x   HS S 30\"x3    x          Banded bridges 20x3\" Plum        Clam shells 20x [place]      Side lying abd 20x Plum      SB HS curl  10x Purple SB     Plank 35\"x3             Step ups 20x 12\" Left foot stays up x   Heel tap 20x 6\"     Calf raises 20x  Back of treadmill x   3-way hip 20x Blue[place]     x   Box squats 20x 18\" Plum on knees x   Box hovers 20x3\" 18\" Plum on knees x   TKE 20x5\" [place]      BOSU lunges 20x  Fwd 20, lateral 10 x   TG:squats/HR 30x L 16   x   Other:     Specific Instructions for next treatment: progress per protocol      Treatment Charges: Mins Units   []  Modalities     [x]  Ther Exercise  48 3   []  Manual Therapy     []  Ther Activities     []  Aquatics     [x]  Vasocompression 15 1   []  Other     Total Treatment time 63 4       Assessment: [x] Progressing toward goals.   Continued working on pt mechanics with exercises to help develop good form while increasing strength. Able to progress height with step up with concentration on eccentric lowering to help improve HS strength. Added in a plum band around pt knees to aide with glute activation with box squatting. Completed all table exercises at end of program.     [] No change. [] Other:  [x] Patient would continue to benefit from skilled physical therapy services in order to: restore L knee ROM and strength to allow pt to return to PLOF. STG: (to be met in 8 treatments)  ? Pain: Patient will report pain as 1/10 Patient reports pain as 0/10 (MET)   ? ROM: Patient will improve (L) knee AROM to 0-110 deg in order to improve gait mechanics (L) knee AROM: -4-110 deg (ongoing)   Patient will improve (L) ankle AROM to 10-60 deg in order to indicate improved gastroc length and improve gait mechanics (L) ankle AROM: 10-60 deg (MET)   ? Function: Patient will be able to ambulate around clinic independently with crutches WBAT, increased sukhdeep and increased stance time on (L) LE Patient able to ambulate around clinic independently without AD and WBAT.  Demonstrates increased sukhdeep and stance time on (L) LE (MET)   Patient to be independent with home exercise program as demonstrated by performance with correct form without cues. (MET)   LTG: (to be met in 16 treatments)  Patient will improve LEFS to <50% functionally impaired in order to indicate improved overall quality of life  Patient will improve (L) knee AROM to 0-120 deg in order to increase ease of negotiating stairs  Patient will improve (L) knee strength to 5/5 in order to increase walking tolerance and assist patient back to running  Patient will improve (B) SLS to 30 sec in order to improve balance and stability within (B) LE  Patient will be able to ambulate around clinic independently without AD, increased sukhdeep, improved (L) knee flexion and extension and improved WB through (L) LE  Patient will report pain as 0/10 Patient goals: \"Full ROM and return to work\"    Pt. Education:  [x] Yes  [] No  [x] Reviewed Prior HEP/Ed  Method of Education: [x] Verbal  [] Demo  [] Written  Comprehension of Education:  [x] Verbalizes understanding. [x] Demonstrates understanding. [x] Needs review. [] Demonstrates/verbalizes HEP/Ed previously given. Access Code: [email or web address]  URL: [email or web address]  Date: 07/20/2022  Prepared by: Aramis Mcdermott     Exercises  Seated Hamstring Stretch - 2 x daily - 7 x weekly - 3 sets - 30 sec hold  Gastroc Stretch on Wall - 2 x daily - 7 x weekly - 3 sets - 30 sec hold  Seated Heel Slide - 2 x daily - 7 x weekly - 3 sets - 10 reps  Supine Quad Set - 2 x daily - 7 x weekly - 3 sets - 10 reps - 5 sec hold  Active Straight Leg Raise with Quad Set - 2 x daily - 7 x weekly - 3 sets - 10 reps  Sidelying Hip Abduction - 2 x daily - 7 x weekly - 3 sets - 10 reps       Treatment Plan:  [x] Therapeutic Exercise   51585  [] Iontophoresis: 4 mg/mL Dexamethasone Sodium Phosphate  mAmin  58305   [x] Therapeutic Activity  65652 [x] Vasopneumatic cold with compression  39093    [] Gait Training   07127 [] Ultrasound   [id number]   [] Neuromuscular Re-education  14735 [] Electrical Stimulation Unattended  13291   [x] Manual Therapy  03845 [] Electrical Stimulation Attended  40571   [x] Instruction in HEP  [] Lumbar/Cervical Traction  15965   [] Aquatic Therapy   62125 [x] Cold/hotpack    [] Massage   88603      [] Dry Needling, 1 or 2 muscles  80343   [] Biofeedback, first 15 minutes   21075  [] Biofeedback, additional 15 minutes   41705 [] Dry Needling, 3 or more muscles  70056     Plan:    [x] Continue current frequency toward long and short term goals.     [x] Specific Instructions for subsequent treatments: cont per POC                                   Time In: 11:07 am            Time Out: 12:22 pm    Electronically signed by:  Abi Sumner PTA

## 2022-09-15 ENCOUNTER — HOSPITAL ENCOUNTER (OUTPATIENT)
Dept: PHYSICAL THERAPY | Facility: CLINIC | Age: 27
Setting detail: THERAPIES SERIES
Discharge: HOME | End: 2022-09-15
Payer: MEDICARE

## 2022-09-15 NOTE — FLOWSHEET NOTE
[] [facility] - [facility] &  Therapy  [address]    [phone]  F: [phone]   [] [address]  [facility] [address]   [address]  P: [phone]  F: [phone]  [] [address] &  Therapy  [address]  P: [phone]  F: [phone] [] [address]  P: [phone]  F: [phone]  [] [address] [facility]   [address]   [place]: [phone]  F: [phone]   [] [address]  [place]: [phone]   F: [phone]     Physical Therapy Cancel/No Show note    Date: 9/15/2022  Patient: Ellen Levy  : 1995  MRN: [id number]    Cancels/No Shows to date: 3/0    For today's appointment patient:    [x]  Cancelled    [] Rescheduled appointment    [] No-show     Reason given by patient:    []  Patient ill    []  Conflicting appointment    [] No transportation      [x] Conflict with work    [] No reason given    [] Weather related    [] COVID-19    [] Other:      Comments:        [] Next appointment was confirmed    Electronically signed by: Chiquita Galdamez

## 2022-09-20 ENCOUNTER — HOSPITAL ENCOUNTER (OUTPATIENT)
Dept: PHYSICAL THERAPY | Facility: CLINIC | Age: 27
Setting detail: THERAPIES SERIES
Discharge: HOME | End: 2022-09-20
Payer: MEDICARE

## 2022-09-20 PROCEDURE — 97110 THERAPEUTIC EXERCISES: CPT

## 2022-09-20 PROCEDURE — 97016 VASOPNEUMATIC DEVICE THERAPY: CPT

## 2022-09-20 NOTE — FLOWSHEET NOTE
[x] [facility]  Outpatient Rehabilitation &  Therapy  [address]  P: [phone]  F: [phone]     Physical Therapy Daily Treatment Note    Date:  2022  Patient Name:  Isrrael Donald    :  1995  MRN: [id number]  Physician: Araseli Jon MD                                    Insurance: [employer] ( visits approved)   Medical Diagnosis:   Diagnosis   G89.18 (ICD-10-CM) - Acute postoperative pain                             Rehab Codes: M25.562, M25.662, M25.462, R26.89, M62.81, R20.2  Onset date: 22 sx              Next Dr's appt.: 22  Visit# / total visits:     Cancels/No Shows:     Subjective:    Pain:  [x] Yes  [] No Location: L knee Pain Rating: (0-10 scale) 0/10  Pain altered Tx:  [x] No  [] Yes  Action:  Comments: Pt mentioned that she has not been doing any exercises on her own d/t being busy with work and life. Objective: Todays Treatment:  Modalities: Vasocompression: L knee medium pressure 15' post ex      Exercise: L knee  Reps/ Time Weight/ Level Comments    Airdyne 10'     x              SB S 30\"x3    x   HS S 30\"x3    x          Banded bridges 20x3\" Plum        Clam shells 20x [place]      Side lying abd 20x Plum      SB HS curl  10x Purple SB     Plank 35\"x3             Power stride  2x10 12\"   x   Heel tap 10x 6\"  x   Calf raises 20x  Back of treadmill x   3-way hip 20x [place]        Box squats 20x 14\"  x   Box hovers 20x3\" 14\"  x   TKE 20x5\" Plum      Lunge  2x10    x   TG:squat/HR 20x L20      SL RDL touch 10x 12\" box  x   Other:     Specific Instructions for next treatment: progress per protocol      Treatment Charges: Mins Units   []  Modalities     [x]  Ther Exercise 50 3   []  Manual Therapy     []  Ther Activities     []  Aquatics     [x]  Vasocompression 15 1   []  Other     Total Treatment time 65 4       Assessment: [x] Progressing toward goals.   Pt having some difficulties at beginning of exercises d/t issues with stability but able to gain improved composure after first few reps with each exercise. Pt did fatigue quickly and had to modify exercises to meet pt fitness level. Educated pt on importance of HEP for preventable maintenance to support recovering structure. Provided pt with small loop plum band for home table exercises d/t pt not sure where her band is. Finished with vaso at the end of session. [] No change. [] Other:  [x] Patient would continue to benefit from skilled physical therapy services in order to: restore L knee ROM and strength to allow pt to return to PLOF. STG: (to be met in 8 treatments)  ? Pain: Patient will report pain as 1/10 Patient reports pain as 0/10 (MET)   ? ROM: Patient will improve (L) knee AROM to 0-110 deg in order to improve gait mechanics (L) knee AROM: -4-110 deg (ongoing)   Patient will improve (L) ankle AROM to 10-60 deg in order to indicate improved gastroc length and improve gait mechanics (L) ankle AROM: 10-60 deg (MET)   ? Function: Patient will be able to ambulate around clinic independently with crutches WBAT, increased sukhdeep and increased stance time on (L) LE Patient able to ambulate around clinic independently without AD and WBAT.  Demonstrates increased sukhdeep and stance time on (L) LE (MET)   Patient to be independent with home exercise program as demonstrated by performance with correct form without cues. (MET)   LTG: (to be met in 16 treatments)  Patient will improve LEFS to <50% functionally impaired in order to indicate improved overall quality of life  Patient will improve (L) knee AROM to 0-120 deg in order to increase ease of negotiating stairs  Patient will improve (L) knee strength to 5/5 in order to increase walking tolerance and assist patient back to running  Patient will improve (B) SLS to 30 sec in order to improve balance and stability within (B) LE  Patient will be able to ambulate around clinic independently without AD, increased sukhdeep, improved (L) knee flexion and extension and improved WB through (L) LE  Patient will report pain as 0/10                    Patient goals: \"Full ROM and return to work\"    Pt. Education:  [x] Yes  [] No  [x] Reviewed Prior HEP/Ed  Method of Education: [x] Verbal  [] Demo  [] Written  Comprehension of Education:  [x] Verbalizes understanding. [x] Demonstrates understanding. [x] Needs review. [] Demonstrates/verbalizes HEP/Ed previously given. Access Code: [email or web address]  URL: [email or web address]  Date: 07/20/2022  Prepared by: Landy Sitter     Exercises  Seated Hamstring Stretch - 2 x daily - 7 x weekly - 3 sets - 30 sec hold  Gastroc Stretch on Wall - 2 x daily - 7 x weekly - 3 sets - 30 sec hold  Seated Heel Slide - 2 x daily - 7 x weekly - 3 sets - 10 reps  Supine Quad Set - 2 x daily - 7 x weekly - 3 sets - 10 reps - 5 sec hold  Active Straight Leg Raise with Quad Set - 2 x daily - 7 x weekly - 3 sets - 10 reps  Sidelying Hip Abduction - 2 x daily - 7 x weekly - 3 sets - 10 reps       Treatment Plan:  [x] Therapeutic Exercise   09672  [] Iontophoresis: 4 mg/mL Dexamethasone Sodium Phosphate  mAmin  84946   [x] Therapeutic Activity  96623 [x] Vasopneumatic cold with compression  62788    [] Gait Training   44843 [] Ultrasound   [id number]   [] Neuromuscular Re-education  84457 [] Electrical Stimulation Unattended  34108   [x] Manual Therapy  91137 [] Electrical Stimulation Attended  30952   [x] Instruction in HEP  [] Lumbar/Cervical Traction  83416   [] Aquatic Therapy   47984 [x] Cold/hotpack    [] Massage   03672      [] Dry Needling, 1 or 2 muscles  59871   [] Biofeedback, first 15 minutes   88983  [] Biofeedback, additional 15 minutes   03608 [] Dry Needling, 3 or more muscles  30654     Plan:    [x] Continue current frequency toward long and short term goals.     [x] Specific Instructions for subsequent treatments: cont per POC                                   Time In: 5:00 pm

## 2022-09-21 ENCOUNTER — HOSPITAL ENCOUNTER (OUTPATIENT)
Dept: PHYSICAL THERAPY | Facility: CLINIC | Age: 27
Setting detail: THERAPIES SERIES
Discharge: HOME | End: 2022-09-21
Payer: MEDICARE

## 2022-09-21 NOTE — FLOWSHEET NOTE
[] [facility]) [facility] &  Therapy  [address]    [phone]  F: [phone]   [] [address]  [facility] 36   [address]  P: [phone]  F: [phone]  [] [address] &  Therapy  [address]  P: [phone]  F: [phone] [] [address]  P: [phone]  F: [phone]  [] [address]  [facility]   [address]   [place]: [phone]  F: [phone]   [] [address]  [place]: [phone]   F: [phone]     Physical Therapy Cancel/No Show note    Date: 2022  Patient: Humberto Shaw  : 1995  MRN: [id number]    Cancels/No Shows to date: 3/0    For today's appointment patient:    [x]  Cancelled    [] Rescheduled appointment    [] No-show     Reason given by patient:    [x]  Patient ill    []  Conflicting appointment    [] No transportation      [] Conflict with work    [] No reason given    [] Weather related    [] [id number]    [] Other:      Comments:        [] Next appointment was confirmed    Electronically signed by: Hermes Jimenez

## 2022-09-26 ENCOUNTER — HOSPITAL ENCOUNTER (OUTPATIENT)
Dept: PHYSICAL THERAPY | Facility: CLINIC | Age: 27
Setting detail: THERAPIES SERIES
Discharge: HOME | End: 2022-09-26
Payer: MEDICARE

## 2022-09-26 PROCEDURE — 97110 THERAPEUTIC EXERCISES: CPT

## 2022-09-26 PROCEDURE — 97016 VASOPNEUMATIC DEVICE THERAPY: CPT

## 2022-09-26 NOTE — FLOWSHEET NOTE
[x] [facility]  Outpatient Rehabilitation &  Therapy  [address]  P: [phone]  F: [phone]     Physical Therapy Daily Treatment Note    Date:  2022  Patient Name:  Feliberto Whitman    :  1995  MRN: [id number]  Physician: Braeden Lechuga MD                                    Insurance: [place] [employer] ( visits approved)   Medical Diagnosis:   Diagnosis   G89.18 (ICD-10-CM) - Acute postoperative pain                             Rehab Codes: M25.562, M25.662, M25.462, R26.89, M62.81, R20.2  Onset date: 22 sx              Next Dr's appt.: 22  Visit# / total visits: 10/16    Cancels/No Shows:     Subjective:    Pain:  [x] Yes  [] No Location: L knee Pain Rating: (0-10 scale) 0/10  Pain altered Tx:  [x] No  [] Yes  Action:  Comments: Pt mentioned that she has not been doing any exercises on her own d/t being busy with work and life. No pain this date, pt. noted stiff feeling in knee. Objective: Todays Treatment:  Modalities: Vasocompression: L knee medium pressure 15' post ex      Exercise: L knee  Reps/ Time Weight/ Level Comments    Airdyne 10'     x              SB S 30\"x3    x   HS S 30\"x3    x          Banded bridges 20x3\" Plum    x    Clam shells 20x Plum   x   Side lying abd 20x Plum   x   SB HS curl  15x Purple SB  x   Plank 35\"x3   x          Power stride  2x10 12\"   x   Heel tap 10x 6\"  x   Calf raises 20x  Back of treadmill    3-way hip 20x [place]        Box squats 20x 14\"  x   Box hovers 20x3\" 14\"  x   TKE 20x5\" Plum      Lunge  2x10       TG:squat/HR 20x L20      SL RDL touch 15x 12\" box  x   Other:     Specific Instructions for next treatment: progress per protocol      Treatment Charges: Mins Units   []  Modalities     [x]  Ther Exercise 50 3   []  Manual Therapy     []  Ther Activities     []  Aquatics     [x]  Vasocompression 10 1   []  Other     Total Treatment time 60 4       Assessment: [x] Progressing toward goals. Pt. still struggling with stability in all standing exercises. Pt. benefited from verbal encouragement during standing exercises. No pain noted, just stiffness. Increased reps for SL RDL and SB HS curl with good tolerance. Only slight fatigue noted following SL RDL, min/mod verbal cues required to avoid contralateral hip drop  Added all mat exercises with good tolerance, no fatigue or pain noted following. Educated pt. on importance of proper form during ex, min verbal cues needed to keep hips straight during sidelying exercises. Finished with vaso for decreased soreness post tx. Will continue to progress as tolerated. [] No change. [] Other:  [x] Patient would continue to benefit from skilled physical therapy services in order to: restore L knee ROM and strength to allow pt to return to PLOF. STG: (to be met in 8 treatments)  ? Pain: Patient will report pain as 1/10 Patient reports pain as 0/10 (MET)   ? ROM: Patient will improve (L) knee AROM to 0-110 deg in order to improve gait mechanics (L) knee AROM: -4-110 deg (ongoing)   Patient will improve (L) ankle AROM to 10-60 deg in order to indicate improved gastroc length and improve gait mechanics (L) ankle AROM: 10-60 deg (MET)   ? Function: Patient will be able to ambulate around clinic independently with crutches WBAT, increased sukhdeep and increased stance time on (L) LE Patient able to ambulate around clinic independently without AD and WBAT.  Demonstrates increased sukhdeep and stance time on (L) LE (MET)   Patient to be independent with home exercise program as demonstrated by performance with correct form without cues. (MET)   LTG: (to be met in 16 treatments)  Patient will improve LEFS to <50% functionally impaired in order to indicate improved overall quality of life  Patient will improve (L) knee AROM to 0-120 deg in order to increase ease of negotiating stairs  Patient will improve (L) knee strength to 5/5 in order to increase walking tolerance and assist patient back to running  Patient will improve (B) SLS to 30 sec in order to improve balance and stability within (B) LE  Patient will be able to ambulate around clinic independently without AD, increased sukhdeep, improved (L) knee flexion and extension and improved WB through (L) LE  Patient will report pain as 0/10                    Patient goals: \"Full ROM and return to work\"    Pt. Education:  [x] Yes  [] No  [x] Reviewed Prior HEP/Ed  Method of Education: [x] Verbal  [] Demo  [] Written  Comprehension of Education:  [x] Verbalizes understanding. [x] Demonstrates understanding. [x] Needs review. [] Demonstrates/verbalizes HEP/Ed previously given. Access Code: [email or web address]  URL: [email or web address]  Date: 07/20/2022  Prepared by: Derrell Dhillon     Exercises  Seated Hamstring Stretch - 2 x daily - 7 x weekly - 3 sets - 30 sec hold  Gastroc Stretch on Wall - 2 x daily - 7 x weekly - 3 sets - 30 sec hold  Seated Heel Slide - 2 x daily - 7 x weekly - 3 sets - 10 reps  Supine Quad Set - 2 x daily - 7 x weekly - 3 sets - 10 reps - 5 sec hold  Active Straight Leg Raise with Quad Set - 2 x daily - 7 x weekly - 3 sets - 10 reps  Sidelying Hip Abduction - 2 x daily - 7 x weekly - 3 sets - 10 reps       Treatment Plan:  [x] Therapeutic Exercise   37404  [] Iontophoresis: 4 mg/mL Dexamethasone Sodium Phosphate  mAmin  41385   [x] Therapeutic Activity  55943 [x] Vasopneumatic cold with compression  39485    [] Gait Training   26562 [] Ultrasound   [id number]   [] Neuromuscular Re-education  22530 [] Electrical Stimulation Unattended  65955   [x] Manual Therapy  09193 [] Electrical Stimulation Attended  32644   [x] Instruction in HEP  [] Lumbar/Cervical Traction  [id number]   [] Aquatic Therapy   67922 [x] Cold/hotpack    [] Massage   42661      [] Dry Needling, 1 or 2 muscles  44467   [] Biofeedback, first 15 minutes   85736  [] Biofeedback, additional 15 minutes   88208 [] Dry Needling, 3 or more muscles  36505     Plan:    [x] Continue current frequency toward long and short term goals.     [x] Specific Instructions for subsequent treatments: cont per POC                                   Time In: 11:08 pm            Time Out: 12:14 pm    Electronically signed by:  Christophe Lenz PTA

## 2022-09-29 ENCOUNTER — HOSPITAL ENCOUNTER (OUTPATIENT)
Dept: PHYSICAL THERAPY | Facility: CLINIC | Age: 27
Setting detail: THERAPIES SERIES
Discharge: HOME | End: 2022-09-29
Payer: MEDICARE

## 2022-09-29 NOTE — FLOWSHEET NOTE
[] [facility] [address]  [address]    [phone]  F: [phone]   [] [address] [facility] [address]   [address]  P: [phone]  F: [phone]  [x] [address] &  Therapy  [address] [facility]  P: [phone]  F: [phone] [] [address]  P: [phone]  F: [phone]  [] [address]  [address] [facility]   [address]   [place]: [phone]  F: [phone]   [] [address]  [place]: [phone]   F: [phone]     Physical Therapy Cancel/No Show note    Date: 2022  Patient: Estelita Pichardo  : 1995  MRN: [id number]    Cancels/No Shows to date:     For today's appointment patient:    [x]  Cancelled    [] Rescheduled appointment    [] No-show     Reason given by patient:    []  Patient ill    []  Conflicting appointment    [] No transportation      [] Conflict with work    [] No reason given    [] Weather related    [] [id number]    [x] Other:      Comments:   today      [] Next appointment was confirmed    Electronically signed by: Anitha Guerrero PTA

## 2022-11-21 ENCOUNTER — HOSPITAL ENCOUNTER (OUTPATIENT)
Dept: PHYSICAL THERAPY | Facility: CLINIC | Age: 27
Setting detail: THERAPIES SERIES
Discharge: HOME | End: 2022-11-21
Payer: MEDICARE

## 2022-11-21 PROCEDURE — 97016 VASOPNEUMATIC DEVICE THERAPY: CPT

## 2022-11-21 PROCEDURE — 97110 THERAPEUTIC EXERCISES: CPT

## 2022-11-21 NOTE — FLOWSHEET NOTE
[x] [facility]  Outpatient Rehabilitation &  Therapy  [address]  P: [phone]  F: [phone]     Physical Therapy Daily Treatment Note    Date:  2022  Patient Name:  Eliz Can    :  1995  MRN: [id number]  Physician: Kerrie Mraie MD                                    Insurance: [employer] ( visits approved)   Medical Diagnosis:   Diagnosis   G89.18 (ICD-10-CM) - Acute postoperative pain                             Rehab Codes: M25.562, M25.662, M25.462, R26.89, M62.81, R20.2  Onset date: 22 sx              Next Dr's appt.: 2023  Visit# / total visits:     Cancels/No Shows:     Subjective:    Pain:  [x] Yes  [] No Location: L knee Pain Rating: (0-10 scale) 0/10  Pain altered Tx:  [x] No  [] Yes  Action:  Comments: Pt stated that she wears her functional brace at work only and she still gets swelling in her knee so she uses ice often. Objective:        Todays Treatment:  Modalities: Vasocompression: L knee medium pressure 15' post ex      Exercise: L knee  Reps/ Time Weight/ Level Comments    Airdyne 10'     x              SB S 30\"x3    x   HS S 30\"x3    x          Banded bridges 20x3\" Plum        Clam shells 20x [place]      Side lying abd 20x Plum      SB HS curl  15x Purple SB     Plank 35\"x3             Power stride  2x10 12\"      Heel tap 20x 6\" Laterally  x   3-way lunge 10x  Fwd/lat/retro x   3-way hip 20x [place]        Box squats 20x 14\"     Box hovers 20x3\" 14\"     TKE 20x5\" Plum      TG:squat/HR  20x L20 Double leg and single leg x   SL RDL  2x10 Pink KB  x   Other:     Specific Instructions for next treatment: progress per protocol  Measurements: -1° to 115° (2022)    Treatment Charges: Mins Units   []  Modalities     [x]  Ther Exercise 50 3   []  Manual Therapy     []  Ther Activities     []  Aquatics     [x]  Vasocompression 10 1   []  Other     Total Treatment time 60 4       Assessment: [x] Progressing toward goals. Pt with improved mechanics with heel taps and squatting on TG. Difficulty with motion during lateral lunges with time spent on corrective technique. Pt able to complete single leg deadlifts with good form and min verbal cueing but did fatigue quickly with exercises. Documented measurements and educated pt on exercises that can be performed on her own with soon to be required gym membership. Finished session with baljit. [] No change. [] Other:  [x] Patient would continue to benefit from skilled physical therapy services in order to: restore L knee ROM and strength to allow pt to return to PLOF. STG: (to be met in 8 treatments)  ? Pain: Patient will report pain as 1/10 Patient reports pain as 0/10 (MET)   ? ROM: Patient will improve (L) knee AROM to 0-110 deg in order to improve gait mechanics (L) knee AROM: -4-110 deg (ongoing)   Patient will improve (L) ankle AROM to 10-60 deg in order to indicate improved gastroc length and improve gait mechanics (L) ankle AROM: 10-60 deg (MET)   ? Function: Patient will be able to ambulate around clinic independently with crutches WBAT, increased sukhdeep and increased stance time on (L) LE Patient able to ambulate around clinic independently without AD and WBAT.  Demonstrates increased sukhdeep and stance time on (L) LE (MET)   Patient to be independent with home exercise program as demonstrated by performance with correct form without cues. (MET)   LTG: (to be met in 16 treatments)  Patient will improve LEFS to <50% functionally impaired in order to indicate improved overall quality of life  Patient will improve (L) knee AROM to 0-120 deg in order to increase ease of negotiating stairs  Patient will improve (L) knee strength to 5/5 in order to increase walking tolerance and assist patient back to running  Patient will improve (B) SLS to 30 sec in order to improve balance and stability within (B) LE  Patient will be able to ambulate around clinic independently without AD, increased sukhdeep, improved (L) knee flexion and extension and improved WB through (L) LE  Patient will report pain as 0/10                    Patient goals: \"Full ROM and return to work\"    Pt. Education:  [x] Yes  [] No  [x] Reviewed Prior HEP/Ed  Method of Education: [x] Verbal  [] Demo  [] Written  Comprehension of Education:  [x] Verbalizes understanding. [x] Demonstrates understanding. [x] Needs review. [] Demonstrates/verbalizes HEP/Ed previously given. Access Code: [email or web address]  URL: [email or web address]  Date: 07/20/2022  Prepared by: Homeor Dawn     Exercises  Seated Hamstring Stretch - 2 x daily - 7 x weekly - 3 sets - 30 sec hold  Gastroc Stretch on Wall - 2 x daily - 7 x weekly - 3 sets - 30 sec hold  Seated Heel Slide - 2 x daily - 7 x weekly - 3 sets - 10 reps  Supine Quad Set - 2 x daily - 7 x weekly - 3 sets - 10 reps - 5 sec hold  Active Straight Leg Raise with Quad Set - 2 x daily - 7 x weekly - 3 sets - 10 reps  Sidelying Hip Abduction - 2 x daily - 7 x weekly - 3 sets - 10 reps       Treatment Plan:  [x] Therapeutic Exercise   85464  [] Iontophoresis: 4 mg/mL Dexamethasone Sodium Phosphate  mAmin  94356   [x] Therapeutic Activity  12224 [x] Vasopneumatic cold with compression  98470    [] Gait Training   27419 [] Ultrasound   [id number]   [] Neuromuscular Re-education  16452 [] Electrical Stimulation Unattended  80987   [x] Manual Therapy  83878 [] Electrical Stimulation Attended  02335   [x] Instruction in HEP  [] Lumbar/Cervical Traction  73113   [] Aquatic Therapy   32593 [x] Cold/hotpack    [] Massage   97927      [] Dry Needling, 1 or 2 muscles  91320   [] Biofeedback, first 15 minutes   59577  [] Biofeedback, additional 15 minutes   09828 [] Dry Needling, 3 or more muscles  06982     Plan:    [x] Continue current frequency toward long and short term goals.     [x] Specific Instructions for subsequent treatments: cont per POC Time In: 1:06 pm            Time Out: 2:11 pm    Electronically signed by:  Julia Reyes PTA

## 2022-11-28 ENCOUNTER — HOSPITAL ENCOUNTER (OUTPATIENT)
Dept: PHYSICAL THERAPY | Facility: CLINIC | Age: 27
Setting detail: THERAPIES SERIES
Discharge: HOME | End: 2022-11-28
Payer: MEDICARE

## 2022-11-28 NOTE — FLOWSHEET NOTE
[] [facility] ([facility]) [facility] &  Therapy  [address]    [phone]  F: [phone]   [] [address]  [facility] [address]   [address]  P: [phone]  F: [phone]  [] [address] &  Therapy  [address]  P: [phone]  F: [phone] [] [address]  P: [phone]  F: [phone]  [] [address]  [address] [facility] [address]   [address]   [place]: [phone]  F: [phone]   [] [address]  [place]: [phone]   F: [phone]     Physical Therapy Cancel/No Show note    Date: 2022  Patient: Shannen Márquez  : 1995  MRN: [id number]    Cancels/No Shows to date:     For today's appointment patient:    [x]  Cancelled    [] Rescheduled appointment    [] No-show     Reason given by patient:    []  Patient ill    []  Conflicting appointment    [] No transportation      [x] Conflict with work    [] No reason given    [] Weather related    [] COVID-19    [] Other:      Comments:        [] Next appointment was confirmed    Electronically signed by: Jimbo Tineo

## (undated) DEVICE — HYPODERMIC SAFETY NEEDLE: Brand: MAGELLAN

## (undated) DEVICE — Device

## (undated) DEVICE — AMBIENT SUPER TURBOVAC 90: Brand: COBLATION

## (undated) DEVICE — SUTURE VCRL SZ 0 L36IN ABSRB UD L36MM CT-1 1/2 CIR J946H

## (undated) DEVICE — 3.0 MM INTEGRATED CABLE WAND ICW,                                    SABER 30, 30 DEGREE: Brand: COBLATION

## (undated) DEVICE — SUTURE ETHBND 2 L30IN NONABSORBABLE GRN WHT LT GRN MO-7 D8793

## (undated) DEVICE — FAST-FIX 360 STRAIGHT KNOT                                    PUSHER/CUTTER AND SLOTTED CANNULA SETS: Brand: FAST-FIX

## (undated) DEVICE — INTENDED FOR TISSUE SEPARATION, AND OTHER PROCEDURES THAT REQUIRE A SHARP SURGICAL BLADE TO PUNCTURE OR CUT.: Brand: BARD-PARKER ® CARBON RIB-BACK BLADES

## (undated) DEVICE — SUTURE VCRL SZ 2-0 L36IN ABSRB UD L36MM CT-1 1/2 CIR J945H

## (undated) DEVICE — SUTURE VCRL SZ 2-0 L27IN ABSRB UD L26MM CT-2 1/2 CIR J269H

## (undated) DEVICE — 4-PORT MANIFOLD: Brand: NEPTUNE 2

## (undated) DEVICE — SUTURE FIBERSNARE 2 L26IN NONABSORBABLE GRN L12IN FIBERWIRE AR7209SN

## (undated) DEVICE — PADDING CAST W6INXL4YD POLY POR SPUN DACRON SYN VERSATILE

## (undated) DEVICE — DRILL SURG FLIPCUTTER III

## (undated) DEVICE — LEGGINGS, PAIR, 33X51 XL, STERILE: Brand: MEDLINE

## (undated) DEVICE — TUBING PMP L16FT MAIN DISP FOR AR-6400 AR-6475

## (undated) DEVICE — GARMENT,MEDLINE,DVT,INT,CALF,MED, GEN2: Brand: MEDLINE

## (undated) DEVICE — DRESSING HYDROFIBER AQUACEL AG ADVANTAGE 3.5X10 IN

## (undated) DEVICE — PRECISION THIN (9.0 X 0.38 X 31.0MM)

## (undated) DEVICE — SST TWIST DRILL, AO TYPE, 2MM DIA. X 75MM: Brand: MICROAIRE®

## (undated) DEVICE — CONTAINER,SPECIMEN,OR STERILE,4OZ: Brand: MEDLINE

## (undated) DEVICE — SUPER TURBOVAC 90 INTEGRATED CABLE WAND ICW: Brand: COBLATION

## (undated) DEVICE — GAUZE,SPONGE,4"X4",16PLY,XRAY,STRL,LF: Brand: MEDLINE

## (undated) DEVICE — SOLUTION IRRIG 3000ML 0.9% SOD CHL USP UROMATIC PLAS CONT

## (undated) DEVICE — TUBING FLD MGMT Y DBL SPIK DUALWAVE

## (undated) DEVICE — SYRINGE IRRIG 60ML SFT PLIABLE BLB EZ TO GRP 1 HND USE W/

## (undated) DEVICE — SUTURE MCRYL SZ 4-0 L27IN ABSRB UD L19MM PS-2 1/2 CIR PRIM Y426H

## (undated) DEVICE — COVER,MAYO STAND,XL,STERILE: Brand: MEDLINE

## (undated) DEVICE — GOWN,PREVENTION PLUS,XLN/XL,ST,24/CS: Brand: MEDLINE

## (undated) DEVICE — [AGGRESSIVE PLUS CUTTER, ARTHROSCOPIC SHAVER BLADE,  DO NOT RESTERILIZE,  DO NOT USE IF PACKAGE IS DAMAGED,  KEEP DRY,  KEEP AWAY FROM SUNLIGHT]: Brand: FORMULA

## (undated) DEVICE — STRIPPER TENDON QUADPRO HARVESTER 10MM

## (undated) DEVICE — ADHESIVE SKIN CLOSURE TOP 36 CC HI VISC DERMBND MINI

## (undated) DEVICE — YANKAUER,FLEXIBLE HANDLE,REGLR CAPACITY: Brand: MEDLINE INDUSTRIES, INC.

## (undated) DEVICE — PENCIL ES L3M BTTN SWCH HOLSTER W/ BLDE ELECTRD EDGE

## (undated) DEVICE — [STANDARD 12-FLUTE BARREL BUR, ARTHROSCOPIC SHAVER BLADE,  DO NOT RESTERILIZE,  DO NOT USE IF PACKAGE IS DAMAGED,  KEEP DRY,  KEEP AWAY FROM SUNLIGHT]: Brand: FORMULA

## (undated) DEVICE — SUTURE MCRYL SZ 3-0 L27IN ABSRB UD L19MM PS-2 3/8 CIR PRIM Y427H

## (undated) DEVICE — SUTURE MCRYL SZ 3-0 L27IN ABSRB UD L24MM PS-1 3/8 CIR PRIM Y936H

## (undated) DEVICE — SUTURE MCRYL SZ 3-0 L27IN ABSRB UD PS-2 3/8 CIR REV CUT NDL MCP427H

## (undated) DEVICE — PREP-RESISTANT MARKER W/ RULER: Brand: MEDLINE INDUSTRIES, INC.

## (undated) DEVICE — SUTURE NONABSORBABLE MONOFILAMENT 3-0 PS-1 18 IN BLK ETHILON 1663H

## (undated) DEVICE — GLOVE SURG SZ 8 L12IN FNGR THK79MIL GRN LTX FREE

## (undated) DEVICE — GLOVE SURG SZ 75 CRM LTX FREE POLYISOPRENE POLYMER BEAD ANTI